# Patient Record
Sex: MALE | Race: WHITE | NOT HISPANIC OR LATINO | Employment: OTHER | URBAN - METROPOLITAN AREA
[De-identification: names, ages, dates, MRNs, and addresses within clinical notes are randomized per-mention and may not be internally consistent; named-entity substitution may affect disease eponyms.]

---

## 2024-09-20 ENCOUNTER — APPOINTMENT (OUTPATIENT)
Dept: RADIOLOGY | Facility: HOSPITAL | Age: 79
End: 2024-09-20
Payer: MEDICARE

## 2024-09-20 ENCOUNTER — HOSPITAL ENCOUNTER (EMERGENCY)
Facility: HOSPITAL | Age: 79
Discharge: HOME | End: 2024-09-20
Attending: EMERGENCY MEDICINE
Payer: MEDICARE

## 2024-09-20 VITALS
HEIGHT: 71 IN | WEIGHT: 178 LBS | SYSTOLIC BLOOD PRESSURE: 156 MMHG | HEART RATE: 70 BPM | OXYGEN SATURATION: 97 % | BODY MASS INDEX: 24.92 KG/M2 | RESPIRATION RATE: 16 BRPM | DIASTOLIC BLOOD PRESSURE: 78 MMHG | TEMPERATURE: 97 F

## 2024-09-20 DIAGNOSIS — M25.572 ACUTE LEFT ANKLE PAIN: Primary | ICD-10-CM

## 2024-09-20 PROCEDURE — 99284 EMERGENCY DEPT VISIT MOD MDM: CPT | Mod: 25

## 2024-09-20 PROCEDURE — 73610 X-RAY EXAM OF ANKLE: CPT | Mod: LT

## 2024-09-20 PROCEDURE — 93971 EXTREMITY STUDY: CPT

## 2024-09-20 RX ORDER — ACETAMINOPHEN 325 MG/1
975 TABLET ORAL ONCE
Status: COMPLETED | OUTPATIENT
Start: 2024-09-20 | End: 2024-09-20

## 2024-09-20 RX ORDER — LIDOCAINE, MENTHOL 4; 1 G/100G; G/100G
1 PATCH TOPICAL DAILY PRN
Qty: 30 PATCH | Refills: 0 | Status: SHIPPED | OUTPATIENT
Start: 2024-09-20 | End: 2024-10-20

## 2024-09-20 RX ORDER — ACETAMINOPHEN 500 MG/1
500 CAPSULE, LIQUID FILLED ORAL EVERY 6 HOURS PRN
Qty: 20 CAPSULE | Refills: 0 | Status: SHIPPED | OUTPATIENT
Start: 2024-09-20 | End: 2024-09-25

## 2024-09-20 RX ORDER — MELOXICAM 7.5 MG/1
7.5 TABLET ORAL DAILY
Qty: 15 TABLET | Refills: 0 | Status: SHIPPED | OUTPATIENT
Start: 2024-09-20

## 2024-09-20 ASSESSMENT — LIFESTYLE VARIABLES
TOTAL SCORE: 0
HAVE YOU EVER FELT YOU SHOULD CUT DOWN ON YOUR DRINKING: NO
HAVE PEOPLE ANNOYED YOU BY CRITICIZING YOUR DRINKING: NO
EVER FELT BAD OR GUILTY ABOUT YOUR DRINKING: NO
EVER HAD A DRINK FIRST THING IN THE MORNING TO STEADY YOUR NERVES TO GET RID OF A HANGOVER: NO

## 2024-09-20 ASSESSMENT — PAIN SCALES - GENERAL
PAINLEVEL_OUTOF10: 7
PAINLEVEL_OUTOF10: 7

## 2024-09-20 ASSESSMENT — PAIN DESCRIPTION - ONSET: ONSET: SUDDEN

## 2024-09-20 ASSESSMENT — PAIN DESCRIPTION - ORIENTATION: ORIENTATION: LEFT

## 2024-09-20 ASSESSMENT — PAIN DESCRIPTION - DESCRIPTORS
DESCRIPTORS: SHARP
DESCRIPTORS: SHARP

## 2024-09-20 ASSESSMENT — COLUMBIA-SUICIDE SEVERITY RATING SCALE - C-SSRS
1. IN THE PAST MONTH, HAVE YOU WISHED YOU WERE DEAD OR WISHED YOU COULD GO TO SLEEP AND NOT WAKE UP?: NO
2. HAVE YOU ACTUALLY HAD ANY THOUGHTS OF KILLING YOURSELF?: NO
6. HAVE YOU EVER DONE ANYTHING, STARTED TO DO ANYTHING, OR PREPARED TO DO ANYTHING TO END YOUR LIFE?: NO

## 2024-09-20 ASSESSMENT — PAIN DESCRIPTION - FREQUENCY: FREQUENCY: INTERMITTENT

## 2024-09-20 ASSESSMENT — PAIN DESCRIPTION - PAIN TYPE: TYPE: ACUTE PAIN

## 2024-09-20 ASSESSMENT — PAIN - FUNCTIONAL ASSESSMENT: PAIN_FUNCTIONAL_ASSESSMENT: 0-10

## 2024-09-20 ASSESSMENT — PAIN DESCRIPTION - PROGRESSION: CLINICAL_PROGRESSION: NOT CHANGED

## 2024-09-20 ASSESSMENT — PAIN DESCRIPTION - LOCATION: LOCATION: ANKLE

## 2024-09-20 NOTE — ED PROVIDER NOTES
HPI   Chief Complaint   Patient presents with    Leg Pain     Pain to left lower leg just above the ankle, pt denies any injury. Pt is not currently on any blood thinners.         History provided by:  Patient and relative  History of Present Illness:  78-year-old male presents with left lower ankle pain.  It woke him up at about 1 AM this morning while in bed.  It is intermittent.  Nothing seems to make it worse or better.  No treatment prior to arrival.  His foot feels the same temperature as his other foot.  He does not have any loss of sensation.  No fever or chills.  No abdominal or back pain.      PMFSH:   As per HPI, otherwise nurses notes reviewed in EMR.    Past Medical History:   Active Ambulatory Problems     Diagnosis Date Noted    No Active Ambulatory Problems     Resolved Ambulatory Problems     Diagnosis Date Noted    No Resolved Ambulatory Problems     Past Medical History:   Diagnosis Date    Atherosclerotic heart disease of native coronary artery without angina pectoris     Personal history of other diseases of the circulatory system     Personal history of other diseases of the circulatory system     Personal history of other endocrine, nutritional and metabolic disease     Personal history of other endocrine, nutritional and metabolic disease       Past Surgical History:   Past Surgical History:   Procedure Laterality Date    OTHER SURGICAL HISTORY  10/03/2021    Coronary artery bypass graft      Family History: No family history on file.   Social History:    Social History     Socioeconomic History    Marital status:      Spouse name: Not on file    Number of children: Not on file    Years of education: Not on file    Highest education level: Not on file   Occupational History    Not on file   Tobacco Use    Smoking status: Not on file    Smokeless tobacco: Not on file   Substance and Sexual Activity    Alcohol use: Not on file    Drug use: Not on file    Sexual activity: Not on file    Other Topics Concern    Not on file   Social History Narrative    Not on file     Social Determinants of Health     Financial Resource Strain: Not on file   Food Insecurity: Not on file   Transportation Needs: Not on file   Physical Activity: Not on file   Stress: Not on file   Social Connections: Not on file   Intimate Partner Violence: Not on file   Housing Stability: Not on file          Labs Reviewed - No data to display    Lower extremity venous duplex left   Final Result   Negative study.  No deep venous thrombosis of the  right lower   extremity.        MACRO:   None        Signed by: April Mcleod 9/20/2024 10:50 AM   Dictation workstation:   [id number]      XR ankle left 3+ views   Final Result   1. No acute left ankle fracture or major malalignment.   2. Small calcaneal bony spur. Left midfoot arthritic changes.        MACRO:   None        Signed by: Mendoza Glaser 9/20/2024 10:05 AM   Dictation workstation:   [id number]          Diagnostic testing considered:         Review of recent and relevant records:    ED Medication Administration:   ED Medication Administration from 09/20/2024 0839 to 09/20/2024 1058         Date/Time Order Dose Route Action Action by     09/20/2024 1002 EDT acetaminophen (Tylenol) tablet 975 mg 975 mg oral Given FOREST Alves            Prescription Medication Consideration/Given:     Social Determinants of Health Significantly Affecting Care:      Summary:    BP      Temp      Pulse     Resp      SpO2        Abnormal Labs Reviewed - No data to display    Diagnoses as of 09/20/24 1058   Acute left ankle pain    hh        Patient History   Past Medical History:   Diagnosis Date    Atherosclerotic heart disease of native coronary artery without angina pectoris     CAD, multiple vessel    Personal history of other diseases of the circulatory system     H/O: HTN (hypertension)    Personal history of other diseases of the circulatory system     History of atrial fibrillation    Personal  history of other endocrine, nutritional and metabolic disease     History of diabetes mellitus    Personal history of other endocrine, nutritional and metabolic disease     History of high cholesterol     Past Surgical History:   Procedure Laterality Date    OTHER SURGICAL HISTORY  10/03/2021    Coronary artery bypass graft     No family history on file.  Social History     Tobacco Use    Smoking status: Not on file    Smokeless tobacco: Not on file   Substance Use Topics    Alcohol use: Not on file    Drug use: Not on file       Physical Exam   ED Triage Vitals [09/20/24 0846]   Temperature Heart Rate Respirations BP   36.1 °C (97 °F) 72 18 (!) 169/98      Pulse Ox Temp Source Heart Rate Source Patient Position   99 % Tympanic Monitor Lying      BP Location FiO2 (%)     Right arm --       Physical Exam  Physical Exam:    ED Triage Vitals [09/20/24 0846]   Temperature Heart Rate Respirations BP   36.1 °C (97 °F) 72 18 (!) 169/98      Pulse Ox Temp Source Heart Rate Source Patient Position   99 % Tympanic Monitor Lying      BP Location FiO2 (%)     Right arm --         Constitutional: Vital signs per nursing notes.  Well developed, well nourished.  No acute distress.    Psychiatric: alert and oriented to person, place, and time; no abnormalities of mood or affect; memory intact  Eyes: PERRL; conjunctivae and lids normal; EOMI  Cardiovascular: symmetric pulses; no edema; normal capillary refill; distal pulses present  Neurological: normal speech; CN II-XII grossly intact; normal motor and sensory function; no nystagmus  GI: no masses, tenderness, rebound or guarding; no palpable, pulsatile mass; no organomegaly; no hernia; normal bowel sounds; (-) Delgado´s sign; (-) McBurney´s sign; (-) CVA tenderness  Lymphatic: no adenopathy of groin  Musculoskeletal: normal gait and station; normal digits and nails; no gross tendon or ligament injury; normal to palpation; normal strength/tone; neurovascular status intact; (-)  Josephine´s sign; (-) straight leg raise; except left medial ankle with mild tenderness to palpation  Skin: normal to inspection; normal to palpation; no rash  GCS: 15      ED Course & MDM   Diagnoses as of 09/20/24 1058   Acute left ankle pain                 No data recorded     [place] Coma Scale Score: 15 (09/20/24 0941 : Kalee Alves RN)                           Medical Decision Making  Medical Decision Making:    Differential Diagnoses Considered: DVT, peripheral vascular disease, arterial occlusion, muscle strain, ankle sprain, fracture, dislocation     EKG:    Labs Reviewed - No data to display    Lower extremity venous duplex left   Final Result   Negative study.  No deep venous thrombosis of the  right lower   extremity.        MACRO:   None        Signed by: April cMleod 9/20/2024 10:50 AM   Dictation workstation:   [id number]      XR ankle left 3+ views   Final Result   1. No acute left ankle fracture or major malalignment.   2. Small calcaneal bony spur. Left midfoot arthritic changes.        MACRO:   None        Signed by: Mendoza Glaser 9/20/2024 10:05 AM   Dictation workstation:   [id number]          Diagnostic testing considered:         Review of recent and relevant records:    ED Medication Administration:   ED Medication Administration from 09/20/2024 0839 to 09/20/2024 1058         Date/Time Order Dose Route Action Action by     09/20/2024 1002 EDT acetaminophen (Tylenol) tablet 975 mg 975 mg oral Given FOREST Alves            Prescription Medication Consideration/Given:     Social Determinants of Health Significantly Affecting Care:      Summary:    BP      Temp      Pulse     Resp      SpO2        Abnormal Labs Reviewed - No data to display    Diagnostic evaluation was completed.  Ultrasound of the left lower extremity is negative.  No DVT of the right lower extremity.  X-ray of the left ankle shows no acute left ankle fracture or major malalignment.  Small calcaneal bony spur.  Left  midfoot arthritic changes.    Patient was treated in the emergency department with medications for his discomfort.    No urgent or emergent conditions were identified.  The patient will be discharged home with short-term follow-up with his primary care provider.  He will be given medications for his pain and discomfort.    I discussed the results and discharge plan with the patient and/or family/friend if present.  I emphasized the importance of follow up with the physician I referred them to in the timeframe recommended.  I explained reasons for the patient to return to the Emergency Department.  Questions were addressed.  The patient and/or family/friend expressed understanding.        Diagnoses as of 09/20/24 1058   Acute left ankle pain       Procedure  Procedures     Neo RUANO MD  09/20/24 1057

## 2025-04-24 ENCOUNTER — APPOINTMENT (OUTPATIENT)
Dept: CARDIOLOGY | Facility: HOSPITAL | Age: 80
DRG: 641 | End: 2025-04-24
Payer: OTHER GOVERNMENT

## 2025-04-24 ENCOUNTER — HOSPITAL ENCOUNTER (INPATIENT)
Facility: HOSPITAL | Age: 80
LOS: 1 days | Discharge: HOME | DRG: 641 | End: 2025-04-27
Attending: EMERGENCY MEDICINE | Admitting: HOSPITALIST
Payer: OTHER GOVERNMENT

## 2025-04-24 ENCOUNTER — APPOINTMENT (OUTPATIENT)
Dept: RADIOLOGY | Facility: HOSPITAL | Age: 80
DRG: 641 | End: 2025-04-24
Payer: OTHER GOVERNMENT

## 2025-04-24 DIAGNOSIS — E87.5 HYPERKALEMIA: Primary | ICD-10-CM

## 2025-04-24 DIAGNOSIS — N40.0 BENIGN PROSTATIC HYPERPLASIA WITHOUT LOWER URINARY TRACT SYMPTOMS: ICD-10-CM

## 2025-04-24 PROBLEM — I25.10 CAD (CORONARY ARTERY DISEASE): Status: ACTIVE | Noted: 2025-04-24

## 2025-04-24 PROBLEM — Z95.1 HX OF CABG: Status: ACTIVE | Noted: 2025-04-24

## 2025-04-24 PROBLEM — E11.9 TYPE 2 DIABETES MELLITUS WITHOUT COMPLICATION, WITHOUT LONG-TERM CURRENT USE OF INSULIN: Status: ACTIVE | Noted: 2025-04-24

## 2025-04-24 PROBLEM — N18.31 STAGE 3A CHRONIC KIDNEY DISEASE (MULTI): Status: ACTIVE | Noted: 2025-04-24

## 2025-04-24 PROBLEM — I10 ESSENTIAL HYPERTENSION: Status: ACTIVE | Noted: 2025-04-24

## 2025-04-24 LAB
ALBUMIN SERPL BCP-MCNC: 4.3 G/DL (ref 3.4–5)
ALP SERPL-CCNC: 49 U/L (ref 33–136)
ALT SERPL W P-5'-P-CCNC: 20 U/L (ref 10–52)
ANION GAP SERPL CALC-SCNC: 12 MMOL/L (ref 10–20)
AST SERPL W P-5'-P-CCNC: 21 U/L (ref 9–39)
BASOPHILS # BLD AUTO: 0.09 X10*3/UL (ref 0–0.1)
BASOPHILS NFR BLD AUTO: 1.3 %
BILIRUB SERPL-MCNC: 0.5 MG/DL (ref 0–1.2)
BNP SERPL-MCNC: 284 PG/ML (ref 0–99)
BUN SERPL-MCNC: 28 MG/DL (ref 6–23)
CALCIUM SERPL-MCNC: 8.8 MG/DL (ref 8.6–10.3)
CARDIAC TROPONIN I PNL SERPL HS: 5 NG/L (ref 0–20)
CARDIAC TROPONIN I PNL SERPL HS: 5 NG/L (ref 0–20)
CHLORIDE SERPL-SCNC: 107 MMOL/L (ref 98–107)
CO2 SERPL-SCNC: 24 MMOL/L (ref 21–32)
CREAT SERPL-MCNC: 1.36 MG/DL (ref 0.5–1.3)
EGFRCR SERPLBLD CKD-EPI 2021: 53 ML/MIN/1.73M*2
EOSINOPHIL # BLD AUTO: 0.12 X10*3/UL (ref 0–0.4)
EOSINOPHIL NFR BLD AUTO: 1.7 %
ERYTHROCYTE [DISTWIDTH] IN BLOOD BY AUTOMATED COUNT: 14.9 % (ref 11.5–14.5)
GLUCOSE BLD MANUAL STRIP-MCNC: 149 MG/DL (ref 74–99)
GLUCOSE SERPL-MCNC: 98 MG/DL (ref 74–99)
HCT VFR BLD AUTO: 43.8 % (ref 41–52)
HGB BLD-MCNC: 13.6 G/DL (ref 13.5–17.5)
IMM GRANULOCYTES # BLD AUTO: 0.03 X10*3/UL (ref 0–0.5)
IMM GRANULOCYTES NFR BLD AUTO: 0.4 % (ref 0–0.9)
INR PPP: 1.2 (ref 0.9–1.1)
LACTATE SERPL-SCNC: 0.5 MMOL/L (ref 0.4–2)
LYMPHOCYTES # BLD AUTO: 1.37 X10*3/UL (ref 0.8–3)
LYMPHOCYTES NFR BLD AUTO: 19.8 %
MAGNESIUM SERPL-MCNC: 1.67 MG/DL (ref 1.6–2.4)
MCH RBC QN AUTO: 29.9 PG (ref 26–34)
MCHC RBC AUTO-ENTMCNC: 31.1 G/DL (ref 32–36)
MCV RBC AUTO: 96 FL (ref 80–100)
MONOCYTES # BLD AUTO: 0.68 X10*3/UL (ref 0.05–0.8)
MONOCYTES NFR BLD AUTO: 9.8 %
NEUTROPHILS # BLD AUTO: 4.63 X10*3/UL (ref 1.6–5.5)
NEUTROPHILS NFR BLD AUTO: 67 %
NRBC BLD-RTO: 0 /100 WBCS (ref 0–0)
PLATELET # BLD AUTO: 339 X10*3/UL (ref 150–450)
POTASSIUM SERPL-SCNC: 5.9 MMOL/L (ref 3.5–5.3)
POTASSIUM SERPL-SCNC: 6.4 MMOL/L (ref 3.5–5.3)
PROT SERPL-MCNC: 6.9 G/DL (ref 6.4–8.2)
PROTHROMBIN TIME: 12.9 SECONDS (ref 9.8–12.4)
RBC # BLD AUTO: 4.55 X10*6/UL (ref 4.5–5.9)
SODIUM SERPL-SCNC: 137 MMOL/L (ref 136–145)
WBC # BLD AUTO: 6.9 X10*3/UL (ref 4.4–11.3)

## 2025-04-24 PROCEDURE — 84484 ASSAY OF TROPONIN QUANT: CPT | Performed by: EMERGENCY MEDICINE

## 2025-04-24 PROCEDURE — 2500000001 HC RX 250 WO HCPCS SELF ADMINISTERED DRUGS (ALT 637 FOR MEDICARE OP): Performed by: STUDENT IN AN ORGANIZED HEALTH CARE EDUCATION/TRAINING PROGRAM

## 2025-04-24 PROCEDURE — 99223 1ST HOSP IP/OBS HIGH 75: CPT | Performed by: STUDENT IN AN ORGANIZED HEALTH CARE EDUCATION/TRAINING PROGRAM

## 2025-04-24 PROCEDURE — 84075 ASSAY ALKALINE PHOSPHATASE: CPT | Performed by: EMERGENCY MEDICINE

## 2025-04-24 PROCEDURE — 84132 ASSAY OF SERUM POTASSIUM: CPT | Performed by: EMERGENCY MEDICINE

## 2025-04-24 PROCEDURE — 83735 ASSAY OF MAGNESIUM: CPT | Performed by: EMERGENCY MEDICINE

## 2025-04-24 PROCEDURE — 2500000002 HC RX 250 W HCPCS SELF ADMINISTERED DRUGS (ALT 637 FOR MEDICARE OP, ALT 636 FOR OP/ED): Performed by: EMERGENCY MEDICINE

## 2025-04-24 PROCEDURE — 93005 ELECTROCARDIOGRAM TRACING: CPT

## 2025-04-24 PROCEDURE — 83605 ASSAY OF LACTIC ACID: CPT | Performed by: EMERGENCY MEDICINE

## 2025-04-24 PROCEDURE — 82947 ASSAY GLUCOSE BLOOD QUANT: CPT

## 2025-04-24 PROCEDURE — 83880 ASSAY OF NATRIURETIC PEPTIDE: CPT | Performed by: EMERGENCY MEDICINE

## 2025-04-24 PROCEDURE — 99291 CRITICAL CARE FIRST HOUR: CPT | Mod: 25 | Performed by: EMERGENCY MEDICINE

## 2025-04-24 PROCEDURE — 36415 COLL VENOUS BLD VENIPUNCTURE: CPT | Performed by: EMERGENCY MEDICINE

## 2025-04-24 PROCEDURE — 85025 COMPLETE CBC W/AUTO DIFF WBC: CPT | Performed by: EMERGENCY MEDICINE

## 2025-04-24 PROCEDURE — 96365 THER/PROPH/DIAG IV INF INIT: CPT

## 2025-04-24 PROCEDURE — 2500000004 HC RX 250 GENERAL PHARMACY W/ HCPCS (ALT 636 FOR OP/ED): Performed by: EMERGENCY MEDICINE

## 2025-04-24 PROCEDURE — 71045 X-RAY EXAM CHEST 1 VIEW: CPT | Performed by: RADIOLOGY

## 2025-04-24 PROCEDURE — 85610 PROTHROMBIN TIME: CPT | Performed by: EMERGENCY MEDICINE

## 2025-04-24 PROCEDURE — 96375 TX/PRO/DX INJ NEW DRUG ADDON: CPT

## 2025-04-24 PROCEDURE — 2500000005 HC RX 250 GENERAL PHARMACY W/O HCPCS: Performed by: EMERGENCY MEDICINE

## 2025-04-24 PROCEDURE — G0378 HOSPITAL OBSERVATION PER HR: HCPCS

## 2025-04-24 PROCEDURE — 96361 HYDRATE IV INFUSION ADD-ON: CPT

## 2025-04-24 PROCEDURE — 71045 X-RAY EXAM CHEST 1 VIEW: CPT

## 2025-04-24 RX ORDER — CARVEDILOL 25 MG/1
25 TABLET ORAL 2 TIMES DAILY
COMMUNITY
Start: 2024-10-07

## 2025-04-24 RX ORDER — CARVEDILOL 12.5 MG/1
25 TABLET ORAL ONCE
Status: COMPLETED | OUTPATIENT
Start: 2025-04-24 | End: 2025-04-24

## 2025-04-24 RX ORDER — DEXTROSE 50 % IN WATER (D50W) INTRAVENOUS SYRINGE
25 ONCE
Status: COMPLETED | OUTPATIENT
Start: 2025-04-24 | End: 2025-04-24

## 2025-04-24 RX ORDER — ASPIRIN 81 MG/1
81 TABLET ORAL DAILY
COMMUNITY

## 2025-04-24 RX ORDER — SODIUM BICARBONATE 1 MEQ/ML
50 SYRINGE (ML) INTRAVENOUS ONCE
Status: COMPLETED | OUTPATIENT
Start: 2025-04-24 | End: 2025-04-24

## 2025-04-24 RX ORDER — CALCIUM GLUCONATE 20 MG/ML
2 INJECTION, SOLUTION INTRAVENOUS ONCE
Status: COMPLETED | OUTPATIENT
Start: 2025-04-24 | End: 2025-04-24

## 2025-04-24 RX ORDER — LISINOPRIL 10 MG/1
10 TABLET ORAL
COMMUNITY
Start: 2024-12-26 | End: 2025-04-27 | Stop reason: HOSPADM

## 2025-04-24 RX ORDER — FINASTERIDE 5 MG/1
5 TABLET, FILM COATED ORAL
COMMUNITY
Start: 2024-09-17

## 2025-04-24 RX ORDER — FUROSEMIDE 10 MG/ML
40 INJECTION INTRAMUSCULAR; INTRAVENOUS ONCE
Status: COMPLETED | OUTPATIENT
Start: 2025-04-24 | End: 2025-04-24

## 2025-04-24 RX ADMIN — DEXTROSE MONOHYDRATE 25 G: 25 INJECTION, SOLUTION INTRAVENOUS at 22:50

## 2025-04-24 RX ADMIN — CARVEDILOL 25 MG: 12.5 TABLET, FILM COATED ORAL at 22:52

## 2025-04-24 RX ADMIN — SODIUM CHLORIDE 500 ML: 0.9 INJECTION, SOLUTION INTRAVENOUS at 22:50

## 2025-04-24 RX ADMIN — INSULIN HUMAN 10 UNITS: 100 INJECTION, SOLUTION PARENTERAL at 22:51

## 2025-04-24 RX ADMIN — CALCIUM GLUCONATE 2 G: 20 INJECTION, SOLUTION INTRAVENOUS at 22:50

## 2025-04-24 RX ADMIN — SODIUM BICARBONATE 50 MEQ: 84 INJECTION INTRAVENOUS at 22:51

## 2025-04-24 RX ADMIN — FUROSEMIDE 40 MG: 10 INJECTION, SOLUTION INTRAMUSCULAR; INTRAVENOUS at 22:52

## 2025-04-24 ASSESSMENT — COLUMBIA-SUICIDE SEVERITY RATING SCALE - C-SSRS
2. HAVE YOU ACTUALLY HAD ANY THOUGHTS OF KILLING YOURSELF?: NO
6. HAVE YOU EVER DONE ANYTHING, STARTED TO DO ANYTHING, OR PREPARED TO DO ANYTHING TO END YOUR LIFE?: NO
1. IN THE PAST MONTH, HAVE YOU WISHED YOU WERE DEAD OR WISHED YOU COULD GO TO SLEEP AND NOT WAKE UP?: NO

## 2025-04-24 ASSESSMENT — PAIN SCALES - GENERAL: PAINLEVEL_OUTOF10: 0 - NO PAIN

## 2025-04-24 ASSESSMENT — PAIN - FUNCTIONAL ASSESSMENT: PAIN_FUNCTIONAL_ASSESSMENT: 0-10

## 2025-04-25 ENCOUNTER — APPOINTMENT (OUTPATIENT)
Dept: RADIOLOGY | Facility: HOSPITAL | Age: 80
DRG: 641 | End: 2025-04-25
Payer: OTHER GOVERNMENT

## 2025-04-25 ENCOUNTER — APPOINTMENT (OUTPATIENT)
Dept: CARDIOLOGY | Facility: HOSPITAL | Age: 80
DRG: 641 | End: 2025-04-25
Payer: OTHER GOVERNMENT

## 2025-04-25 DIAGNOSIS — E87.5 HYPERKALEMIA: ICD-10-CM

## 2025-04-25 LAB
25(OH)D3 SERPL-MCNC: 8 NG/ML (ref 30–100)
ANION GAP SERPL CALC-SCNC: 11 MMOL/L (ref 10–20)
ANION GAP SERPL CALC-SCNC: 9 MMOL/L (ref 10–20)
APPEARANCE UR: CLEAR
ATRIAL RATE: 61 BPM
ATRIAL RATE: 71 BPM
ATRIAL RATE: 74 BPM
BACTERIA #/AREA URNS AUTO: ABNORMAL /HPF
BILIRUB UR STRIP.AUTO-MCNC: NEGATIVE MG/DL
BUN SERPL-MCNC: 27 MG/DL (ref 6–23)
BUN SERPL-MCNC: 29 MG/DL (ref 6–23)
CALCIUM SERPL-MCNC: 9.4 MG/DL (ref 8.6–10.3)
CALCIUM SERPL-MCNC: 9.7 MG/DL (ref 8.6–10.3)
CHLORIDE SERPL-SCNC: 104 MMOL/L (ref 98–107)
CHLORIDE SERPL-SCNC: 105 MMOL/L (ref 98–107)
CO2 SERPL-SCNC: 30 MMOL/L (ref 21–32)
CO2 SERPL-SCNC: 31 MMOL/L (ref 21–32)
COLOR UR: ABNORMAL
CREAT SERPL-MCNC: 1.44 MG/DL (ref 0.5–1.3)
CREAT SERPL-MCNC: 1.52 MG/DL (ref 0.5–1.3)
CREAT UR-MCNC: 41.6 MG/DL (ref 20–370)
EGFRCR SERPLBLD CKD-EPI 2021: 47 ML/MIN/1.73M*2
EGFRCR SERPLBLD CKD-EPI 2021: 50 ML/MIN/1.73M*2
ERYTHROCYTE [DISTWIDTH] IN BLOOD BY AUTOMATED COUNT: 14.9 % (ref 11.5–14.5)
GLUCOSE BLD MANUAL STRIP-MCNC: 101 MG/DL (ref 74–99)
GLUCOSE BLD MANUAL STRIP-MCNC: 131 MG/DL (ref 74–99)
GLUCOSE BLD MANUAL STRIP-MCNC: 77 MG/DL (ref 74–99)
GLUCOSE BLD MANUAL STRIP-MCNC: 95 MG/DL (ref 74–99)
GLUCOSE SERPL-MCNC: 111 MG/DL (ref 74–99)
GLUCOSE SERPL-MCNC: 77 MG/DL (ref 74–99)
GLUCOSE UR STRIP.AUTO-MCNC: ABNORMAL MG/DL
HCT VFR BLD AUTO: 45.7 % (ref 41–52)
HGB BLD-MCNC: 14.1 G/DL (ref 13.5–17.5)
HOLD SPECIMEN: NORMAL
KETONES UR STRIP.AUTO-MCNC: NEGATIVE MG/DL
LEUKOCYTE ESTERASE UR QL STRIP.AUTO: NEGATIVE
MAGNESIUM SERPL-MCNC: 1.56 MG/DL (ref 1.6–2.4)
MCH RBC QN AUTO: 29.5 PG (ref 26–34)
MCHC RBC AUTO-ENTMCNC: 30.9 G/DL (ref 32–36)
MCV RBC AUTO: 96 FL (ref 80–100)
MICROALBUMIN UR-MCNC: 164.3 MG/L
MICROALBUMIN/CREAT UR: 395 UG/MG CREAT
NITRITE UR QL STRIP.AUTO: NEGATIVE
NRBC BLD-RTO: 0 /100 WBCS (ref 0–0)
P AXIS: -26 DEGREES
P AXIS: 8 DEGREES
P OFFSET: 195 MS
P OFFSET: 196 MS
P OFFSET: 196 MS
P ONSET: 130 MS
P ONSET: 141 MS
P ONSET: 148 MS
PH UR STRIP.AUTO: 5.5 [PH]
PLATELET # BLD AUTO: 376 X10*3/UL (ref 150–450)
POTASSIUM SERPL-SCNC: 4.6 MMOL/L (ref 3.5–5.3)
POTASSIUM SERPL-SCNC: 5.1 MMOL/L (ref 3.5–5.3)
PR INTERVAL: 164 MS
PR INTERVAL: 166 MS
PR INTERVAL: 174 MS
PROT UR STRIP.AUTO-MCNC: ABNORMAL MG/DL
PTH-INTACT SERPL-MCNC: 84.5 PG/ML (ref 18.5–88)
Q ONSET: 213 MS
Q ONSET: 228 MS
Q ONSET: 230 MS
QRS COUNT: 10 BEATS
QRS COUNT: 12 BEATS
QRS COUNT: 12 BEATS
QRS DURATION: 64 MS
QRS DURATION: 74 MS
QRS DURATION: 84 MS
QT INTERVAL: 378 MS
QT INTERVAL: 384 MS
QT INTERVAL: 412 MS
QTC CALCULATION(BAZETT): 410 MS
QTC CALCULATION(BAZETT): 414 MS
QTC CALCULATION(BAZETT): 426 MS
QTC FREDERICIA: 400 MS
QTC FREDERICIA: 412 MS
QTC FREDERICIA: 414 MS
R AXIS: -14 DEGREES
R AXIS: -4 DEGREES
R AXIS: -6 DEGREES
RBC # BLD AUTO: 4.78 X10*6/UL (ref 4.5–5.9)
RBC # UR STRIP.AUTO: NEGATIVE MG/DL
RBC #/AREA URNS AUTO: ABNORMAL /HPF
SODIUM SERPL-SCNC: 139 MMOL/L (ref 136–145)
SODIUM SERPL-SCNC: 141 MMOL/L (ref 136–145)
SP GR UR STRIP.AUTO: 1.01
T AXIS: 11 DEGREES
T AXIS: 27 DEGREES
T AXIS: 8 DEGREES
T OFFSET: 405 MS
T OFFSET: 417 MS
T OFFSET: 436 MS
TSH SERPL-ACNC: 2 MIU/L (ref 0.44–3.98)
UROBILINOGEN UR STRIP.AUTO-MCNC: NORMAL MG/DL
VENTRICULAR RATE: 61 BPM
VENTRICULAR RATE: 71 BPM
VENTRICULAR RATE: 74 BPM
WBC # BLD AUTO: 7 X10*3/UL (ref 4.4–11.3)
WBC #/AREA URNS AUTO: ABNORMAL /HPF

## 2025-04-25 PROCEDURE — 83970 ASSAY OF PARATHORMONE: CPT | Mod: ELYLAB | Performed by: INTERNAL MEDICINE

## 2025-04-25 PROCEDURE — 99239 HOSP IP/OBS DSCHRG MGMT >30: CPT | Performed by: HOSPITALIST

## 2025-04-25 PROCEDURE — 83735 ASSAY OF MAGNESIUM: CPT | Performed by: STUDENT IN AN ORGANIZED HEALTH CARE EDUCATION/TRAINING PROGRAM

## 2025-04-25 PROCEDURE — 36415 COLL VENOUS BLD VENIPUNCTURE: CPT | Performed by: HOSPITALIST

## 2025-04-25 PROCEDURE — 93010 ELECTROCARDIOGRAM REPORT: CPT | Performed by: INTERNAL MEDICINE

## 2025-04-25 PROCEDURE — G0378 HOSPITAL OBSERVATION PER HR: HCPCS

## 2025-04-25 PROCEDURE — 81001 URINALYSIS AUTO W/SCOPE: CPT | Performed by: INTERNAL MEDICINE

## 2025-04-25 PROCEDURE — 82306 VITAMIN D 25 HYDROXY: CPT | Mod: ELYLAB | Performed by: INTERNAL MEDICINE

## 2025-04-25 PROCEDURE — 2500000004 HC RX 250 GENERAL PHARMACY W/ HCPCS (ALT 636 FOR OP/ED): Performed by: HOSPITALIST

## 2025-04-25 PROCEDURE — 82570 ASSAY OF URINE CREATININE: CPT | Performed by: INTERNAL MEDICINE

## 2025-04-25 PROCEDURE — 76770 US EXAM ABDO BACK WALL COMP: CPT | Performed by: RADIOLOGY

## 2025-04-25 PROCEDURE — 80048 BASIC METABOLIC PNL TOTAL CA: CPT | Performed by: HOSPITALIST

## 2025-04-25 PROCEDURE — 84443 ASSAY THYROID STIM HORMONE: CPT | Performed by: INTERNAL MEDICINE

## 2025-04-25 PROCEDURE — 82947 ASSAY GLUCOSE BLOOD QUANT: CPT

## 2025-04-25 PROCEDURE — 80048 BASIC METABOLIC PNL TOTAL CA: CPT | Performed by: STUDENT IN AN ORGANIZED HEALTH CARE EDUCATION/TRAINING PROGRAM

## 2025-04-25 PROCEDURE — 76770 US EXAM ABDO BACK WALL COMP: CPT

## 2025-04-25 PROCEDURE — 2500000001 HC RX 250 WO HCPCS SELF ADMINISTERED DRUGS (ALT 637 FOR MEDICARE OP): Performed by: STUDENT IN AN ORGANIZED HEALTH CARE EDUCATION/TRAINING PROGRAM

## 2025-04-25 PROCEDURE — 85027 COMPLETE CBC AUTOMATED: CPT | Performed by: STUDENT IN AN ORGANIZED HEALTH CARE EDUCATION/TRAINING PROGRAM

## 2025-04-25 PROCEDURE — 93005 ELECTROCARDIOGRAM TRACING: CPT

## 2025-04-25 PROCEDURE — 84153 ASSAY OF PSA TOTAL: CPT | Mod: ELYLAB | Performed by: UROLOGY

## 2025-04-25 RX ORDER — POLYETHYLENE GLYCOL 3350 17 G/17G
17 POWDER, FOR SOLUTION ORAL DAILY
Status: DISCONTINUED | OUTPATIENT
Start: 2025-04-25 | End: 2025-04-27 | Stop reason: HOSPADM

## 2025-04-25 RX ORDER — CARVEDILOL 12.5 MG/1
25 TABLET ORAL 2 TIMES DAILY
Status: DISCONTINUED | OUTPATIENT
Start: 2025-04-25 | End: 2025-04-27 | Stop reason: HOSPADM

## 2025-04-25 RX ORDER — ONDANSETRON HYDROCHLORIDE 2 MG/ML
4 INJECTION, SOLUTION INTRAVENOUS EVERY 8 HOURS PRN
Status: DISCONTINUED | OUTPATIENT
Start: 2025-04-25 | End: 2025-04-27 | Stop reason: HOSPADM

## 2025-04-25 RX ORDER — ONDANSETRON 4 MG/1
4 TABLET, FILM COATED ORAL EVERY 8 HOURS PRN
Status: DISCONTINUED | OUTPATIENT
Start: 2025-04-25 | End: 2025-04-27 | Stop reason: HOSPADM

## 2025-04-25 RX ORDER — FINASTERIDE 5 MG/1
5 TABLET, FILM COATED ORAL DAILY
Status: DISCONTINUED | OUTPATIENT
Start: 2025-04-25 | End: 2025-04-27 | Stop reason: HOSPADM

## 2025-04-25 RX ORDER — TALC
3 POWDER (GRAM) TOPICAL NIGHTLY PRN
Status: DISCONTINUED | OUTPATIENT
Start: 2025-04-25 | End: 2025-04-27 | Stop reason: HOSPADM

## 2025-04-25 RX ORDER — DEXTROSE 50 % IN WATER (D50W) INTRAVENOUS SYRINGE
12.5
Status: DISCONTINUED | OUTPATIENT
Start: 2025-04-25 | End: 2025-04-27 | Stop reason: HOSPADM

## 2025-04-25 RX ORDER — ASPIRIN 81 MG/1
81 TABLET ORAL DAILY
Status: DISCONTINUED | OUTPATIENT
Start: 2025-04-25 | End: 2025-04-27 | Stop reason: HOSPADM

## 2025-04-25 RX ORDER — ACETAMINOPHEN 325 MG/1
650 TABLET ORAL EVERY 4 HOURS PRN
Status: DISCONTINUED | OUTPATIENT
Start: 2025-04-25 | End: 2025-04-27 | Stop reason: HOSPADM

## 2025-04-25 RX ORDER — MAGNESIUM SULFATE HEPTAHYDRATE 40 MG/ML
2 INJECTION, SOLUTION INTRAVENOUS ONCE
Status: COMPLETED | OUTPATIENT
Start: 2025-04-25 | End: 2025-04-25

## 2025-04-25 RX ORDER — DEXTROSE 50 % IN WATER (D50W) INTRAVENOUS SYRINGE
25
Status: DISCONTINUED | OUTPATIENT
Start: 2025-04-25 | End: 2025-04-27 | Stop reason: HOSPADM

## 2025-04-25 RX ORDER — ACETAMINOPHEN 650 MG/1
650 SUPPOSITORY RECTAL EVERY 4 HOURS PRN
Status: DISCONTINUED | OUTPATIENT
Start: 2025-04-25 | End: 2025-04-27 | Stop reason: HOSPADM

## 2025-04-25 RX ORDER — ACETAMINOPHEN 160 MG/5ML
650 SOLUTION ORAL EVERY 4 HOURS PRN
Status: DISCONTINUED | OUTPATIENT
Start: 2025-04-25 | End: 2025-04-27 | Stop reason: HOSPADM

## 2025-04-25 RX ADMIN — EMPAGLIFLOZIN 25 MG: 25 TABLET, FILM COATED ORAL at 09:18

## 2025-04-25 RX ADMIN — CARVEDILOL 25 MG: 12.5 TABLET, FILM COATED ORAL at 20:37

## 2025-04-25 RX ADMIN — CARVEDILOL 25 MG: 12.5 TABLET, FILM COATED ORAL at 09:18

## 2025-04-25 RX ADMIN — ASPIRIN 81 MG: 81 TABLET, COATED ORAL at 09:18

## 2025-04-25 RX ADMIN — FINASTERIDE 5 MG: 5 TABLET, FILM COATED ORAL at 09:18

## 2025-04-25 RX ADMIN — MAGNESIUM SULFATE HEPTAHYDRATE 2 G: 40 INJECTION, SOLUTION INTRAVENOUS at 09:18

## 2025-04-25 SDOH — SOCIAL STABILITY: SOCIAL INSECURITY: HAS ANYONE EVER THREATENED TO HURT YOUR FAMILY OR YOUR PETS?: NO

## 2025-04-25 SDOH — HEALTH STABILITY: PHYSICAL HEALTH: ON AVERAGE, HOW MANY MINUTES DO YOU ENGAGE IN EXERCISE AT THIS LEVEL?: 110 MIN

## 2025-04-25 SDOH — SOCIAL STABILITY: SOCIAL INSECURITY
WITHIN THE LAST YEAR, HAVE YOU BEEN RAPED OR FORCED TO HAVE ANY KIND OF SEXUAL ACTIVITY BY YOUR PARTNER OR EX-PARTNER?: NO

## 2025-04-25 SDOH — ECONOMIC STABILITY: HOUSING INSECURITY: IN THE LAST 12 MONTHS, WAS THERE A TIME WHEN YOU WERE NOT ABLE TO PAY THE MORTGAGE OR RENT ON TIME?: NO

## 2025-04-25 SDOH — SOCIAL STABILITY: SOCIAL INSECURITY: WITHIN THE LAST YEAR, HAVE YOU BEEN AFRAID OF YOUR PARTNER OR EX-PARTNER?: NO

## 2025-04-25 SDOH — SOCIAL STABILITY: SOCIAL INSECURITY: HAVE YOU HAD ANY THOUGHTS OF HARMING ANYONE ELSE?: NO

## 2025-04-25 SDOH — ECONOMIC STABILITY: HOUSING INSECURITY: IN THE PAST 12 MONTHS, HOW MANY TIMES HAVE YOU MOVED WHERE YOU WERE LIVING?: 0

## 2025-04-25 SDOH — SOCIAL STABILITY: SOCIAL INSECURITY: WITHIN THE LAST YEAR, HAVE YOU BEEN HUMILIATED OR EMOTIONALLY ABUSED IN OTHER WAYS BY YOUR PARTNER OR EX-PARTNER?: NO

## 2025-04-25 SDOH — ECONOMIC STABILITY: FOOD INSECURITY: WITHIN THE PAST 12 MONTHS, THE FOOD YOU BOUGHT JUST DIDN'T LAST AND YOU DIDN'T HAVE MONEY TO GET MORE.: NEVER TRUE

## 2025-04-25 SDOH — ECONOMIC STABILITY: HOUSING INSECURITY: AT ANY TIME IN THE PAST 12 MONTHS, WERE YOU HOMELESS OR LIVING IN A SHELTER (INCLUDING NOW)?: NO

## 2025-04-25 SDOH — SOCIAL STABILITY: SOCIAL INSECURITY: ARE YOU MARRIED, WIDOWED, DIVORCED, SEPARATED, NEVER MARRIED, OR LIVING WITH A PARTNER?: DIVORCED

## 2025-04-25 SDOH — SOCIAL STABILITY: SOCIAL INSECURITY: WERE YOU ABLE TO COMPLETE ALL THE BEHAVIORAL HEALTH SCREENINGS?: YES

## 2025-04-25 SDOH — SOCIAL STABILITY: SOCIAL NETWORK: HOW OFTEN DO YOU ATTEND CHURCH OR RELIGIOUS SERVICES?: NEVER

## 2025-04-25 SDOH — ECONOMIC STABILITY: INCOME INSECURITY: IN THE PAST 12 MONTHS HAS THE ELECTRIC, GAS, OIL, OR WATER COMPANY THREATENED TO SHUT OFF SERVICES IN YOUR HOME?: NO

## 2025-04-25 SDOH — SOCIAL STABILITY: SOCIAL INSECURITY: DO YOU FEEL ANYONE HAS EXPLOITED OR TAKEN ADVANTAGE OF YOU FINANCIALLY OR OF YOUR PERSONAL PROPERTY?: NO

## 2025-04-25 SDOH — SOCIAL STABILITY: SOCIAL INSECURITY: DOES ANYONE TRY TO KEEP YOU FROM HAVING/CONTACTING OTHER FRIENDS OR DOING THINGS OUTSIDE YOUR HOME?: NO

## 2025-04-25 SDOH — HEALTH STABILITY: MENTAL HEALTH
DO YOU FEEL STRESS - TENSE, RESTLESS, NERVOUS, OR ANXIOUS, OR UNABLE TO SLEEP AT NIGHT BECAUSE YOUR MIND IS TROUBLED ALL THE TIME - THESE DAYS?: NOT AT ALL

## 2025-04-25 SDOH — SOCIAL STABILITY: SOCIAL INSECURITY: HAVE YOU HAD THOUGHTS OF HARMING ANYONE ELSE?: NO

## 2025-04-25 SDOH — ECONOMIC STABILITY: FOOD INSECURITY: HOW HARD IS IT FOR YOU TO PAY FOR THE VERY BASICS LIKE FOOD, HOUSING, MEDICAL CARE, AND HEATING?: NOT HARD AT ALL

## 2025-04-25 SDOH — HEALTH STABILITY: PHYSICAL HEALTH
HOW OFTEN DO YOU NEED TO HAVE SOMEONE HELP YOU WHEN YOU READ INSTRUCTIONS, PAMPHLETS, OR OTHER WRITTEN MATERIAL FROM YOUR DOCTOR OR PHARMACY?: RARELY

## 2025-04-25 SDOH — SOCIAL STABILITY: SOCIAL NETWORK: IN A TYPICAL WEEK, HOW MANY TIMES DO YOU TALK ON THE PHONE WITH FAMILY, FRIENDS, OR NEIGHBORS?: ONCE A WEEK

## 2025-04-25 SDOH — SOCIAL STABILITY: SOCIAL NETWORK: HOW OFTEN DO YOU ATTEND MEETINGS OF THE CLUBS OR ORGANIZATIONS YOU BELONG TO?: NEVER

## 2025-04-25 SDOH — ECONOMIC STABILITY: FOOD INSECURITY: WITHIN THE PAST 12 MONTHS, YOU WORRIED THAT YOUR FOOD WOULD RUN OUT BEFORE YOU GOT THE MONEY TO BUY MORE.: NEVER TRUE

## 2025-04-25 SDOH — SOCIAL STABILITY: SOCIAL INSECURITY: DO YOU FEEL UNSAFE GOING BACK TO THE PLACE WHERE YOU ARE LIVING?: NO

## 2025-04-25 SDOH — SOCIAL STABILITY: SOCIAL NETWORK
DO YOU BELONG TO ANY CLUBS OR ORGANIZATIONS SUCH AS CHURCH GROUPS, UNIONS, FRATERNAL OR ATHLETIC GROUPS, OR SCHOOL GROUPS?: NO

## 2025-04-25 SDOH — SOCIAL STABILITY: SOCIAL INSECURITY: ARE YOU OR HAVE YOU BEEN THREATENED OR ABUSED PHYSICALLY, EMOTIONALLY, OR SEXUALLY BY ANYONE?: NO

## 2025-04-25 SDOH — HEALTH STABILITY: PHYSICAL HEALTH: ON AVERAGE, HOW MANY DAYS PER WEEK DO YOU ENGAGE IN MODERATE TO STRENUOUS EXERCISE (LIKE A BRISK WALK)?: 7 DAYS

## 2025-04-25 SDOH — SOCIAL STABILITY: SOCIAL INSECURITY
WITHIN THE LAST YEAR, HAVE YOU BEEN KICKED, HIT, SLAPPED, OR OTHERWISE PHYSICALLY HURT BY YOUR PARTNER OR EX-PARTNER?: NO

## 2025-04-25 SDOH — SOCIAL STABILITY: SOCIAL NETWORK: HOW OFTEN DO YOU GET TOGETHER WITH FRIENDS OR RELATIVES?: ONCE A WEEK

## 2025-04-25 SDOH — ECONOMIC STABILITY: TRANSPORTATION INSECURITY: IN THE PAST 12 MONTHS, HAS LACK OF TRANSPORTATION KEPT YOU FROM MEDICAL APPOINTMENTS OR FROM GETTING MEDICATIONS?: NO

## 2025-04-25 SDOH — SOCIAL STABILITY: SOCIAL INSECURITY: ARE THERE ANY APPARENT SIGNS OF INJURIES/BEHAVIORS THAT COULD BE RELATED TO ABUSE/NEGLECT?: NO

## 2025-04-25 SDOH — SOCIAL STABILITY: SOCIAL INSECURITY: ABUSE: ADULT

## 2025-04-25 ASSESSMENT — COGNITIVE AND FUNCTIONAL STATUS - GENERAL
DAILY ACTIVITIY SCORE: 24
MOBILITY SCORE: 24
DAILY ACTIVITIY SCORE: 24
PATIENT BASELINE BEDBOUND: NO
DAILY ACTIVITIY SCORE: 24

## 2025-04-25 ASSESSMENT — LIFESTYLE VARIABLES
HOW OFTEN DO YOU HAVE 6 OR MORE DRINKS ON ONE OCCASION: NEVER
HOW MANY STANDARD DRINKS CONTAINING ALCOHOL DO YOU HAVE ON A TYPICAL DAY: PATIENT DOES NOT DRINK
SKIP TO QUESTIONS 9-10: 1
AUDIT-C TOTAL SCORE: 0
AUDIT-C TOTAL SCORE: 0
HOW OFTEN DO YOU HAVE A DRINK CONTAINING ALCOHOL: NEVER

## 2025-04-25 ASSESSMENT — ACTIVITIES OF DAILY LIVING (ADL)
JUDGMENT_ADEQUATE_SAFELY_COMPLETE_DAILY_ACTIVITIES: YES
ADEQUATE_TO_COMPLETE_ADL: YES
WALKS IN HOME: INDEPENDENT
GROOMING: INDEPENDENT
PATIENT'S MEMORY ADEQUATE TO SAFELY COMPLETE DAILY ACTIVITIES?: YES
FEEDING YOURSELF: INDEPENDENT
LACK_OF_TRANSPORTATION: NO
LACK_OF_TRANSPORTATION: NO
HEARING - LEFT EAR: FUNCTIONAL
LACK_OF_TRANSPORTATION: NO
BATHING: INDEPENDENT
TOILETING: INDEPENDENT
DRESSING YOURSELF: INDEPENDENT
HEARING - RIGHT EAR: FUNCTIONAL

## 2025-04-25 ASSESSMENT — PAIN SCALES - GENERAL
PAINLEVEL_OUTOF10: 0 - NO PAIN
PAINLEVEL_OUTOF10: 0 - NO PAIN

## 2025-04-25 ASSESSMENT — PATIENT HEALTH QUESTIONNAIRE - PHQ9
2. FEELING DOWN, DEPRESSED OR HOPELESS: NOT AT ALL
1. LITTLE INTEREST OR PLEASURE IN DOING THINGS: NOT AT ALL
SUM OF ALL RESPONSES TO PHQ9 QUESTIONS 1 & 2: 0

## 2025-04-25 ASSESSMENT — PAIN - FUNCTIONAL ASSESSMENT: PAIN_FUNCTIONAL_ASSESSMENT: 0-10

## 2025-04-25 NOTE — H&P
Medical Group History and Physical      ASSESSMENT & PLAN:     79 y.o. male with a history of essential hypertension, CKD stage IIIa, CAD s/p CABG, type 2 diabetes, BPH presenting with hyperkalemia.    #.  Hyperkalemia  #.  CKD stage IIIa  - [facility] however mild hemolysis noted now with repeat showing 5.9 without hemolysis  - EKG reviewed and shows NSR without peaked T waves or QRS widening  - S/p IV Calcium gluconate and temporizing measures in the ER  - Creatinine appears to be at baseline from prior labs    Plan:  - Holding home lisinopril  - Telemetry  - Nephrology consult  - Repeat labs in the morning  - Consider Lokelma if remains hyperkalemic despite holding Lisinopril    #.  Essential hypertension  #.  CAD s/p CABG  - Markedly hypertensive without signs of endorgan damage  - Continue home aspirin, Coreg  - Holding home lisinopril given hyperkalemia as above  - May need alternative agent instead of lisinopril if persistent issues with hyperkalemia    #.  Type 2 diabetes  - Continuing home Jardiance    #.  BPH  - Continue home finasteride    VTE PPX: SCDs      Khoi De Luna MD    --Of note, this documentation is completed using the Dragon Dictation system (voice recognition software). There may be spelling and/or grammatical errors that were not corrected prior to final submission.--    HISTORY OF PRESENT ILLNESS:   Chief Complaint: abnormal labs    Duane Meade is a 79 y.o. male with a history of essential hypertension, CKD stage IIIa, CAD s/p CABG, type 2 diabetes, BPH presenting with abnormal labs.  [facility] drawn.  He was sent in due to hyperkalemia.  Patient is asymptomatic at this time.  He is on lisinopril.       ER Course: Markedly hypertensive, otherwise vital signs stable.  Labs notable for hyperkalemia and elevated creatinine BUN (at baseline).  EKG shows normal sinus rhythm without peaked T waves or QRS  widening.  CXR negative for acute cardiopulmonary process.  Patient was given IV calcium and temporizing measures in the ER.    ROS  10 point review of systems negative except per HPI     PAST HISTORIES:     Past Medical History  He has a past medical history of Atherosclerotic heart disease of native coronary artery without angina pectoris, Personal history of other diseases of the circulatory system, Personal history of other diseases of the circulatory system, Personal history of other endocrine, nutritional and metabolic disease, and Personal history of other endocrine, nutritional and metabolic disease.    Surgical History  He has a past surgical history that includes Other surgical history (10/03/2021).     Social History  He has no history on file for tobacco use, alcohol use, and drug use.    Family History  Family History[1]    Allergies:  Patient has no known allergies.      OBJECTIVE:      Last Recorded Vitals  BP (!) 202/98 (BP Location: Right arm, Patient Position: Sitting)   Pulse 72   Temp 36.2 °C (97.2 °F) (Temporal)   Resp (!) 33   Wt 77.1 kg (170 lb)   SpO2 100%     Last I/O:  No intake/output data recorded.    Physical Exam   Gen: NAD, appears stated age  HEENT: EOM, MMM  CV: RRR, no murmurs rubs or gallops  Resp: Clear to auscultation bilaterally, normal effort  Abdomen: soft, NT,+BS  LE: No edema, no deformity  Neuro: A&Ox4, moving all extremities    LABS AND IMAGING:       Relevant Results  Labs Reviewed   CBC WITH AUTO DIFFERENTIAL - Abnormal       Result Value    WBC 6.9      nRBC 0.0      RBC 4.55      Hemoglobin 13.6      Hematocrit 43.8      MCV 96      MCH 29.9      MCHC 31.1 (*)     RDW 14.9 (*)     Platelets 339      Neutrophils % 67.0      Immature Granulocytes %, Automated 0.4      Lymphocytes % 19.8      Monocytes % 9.8      Eosinophils % 1.7      Basophils % 1.3      Neutrophils Absolute 4.63      Immature Granulocytes Absolute, Automated 0.03      Lymphocytes Absolute 1.37       Monocytes Absolute 0.68      Eosinophils Absolute 0.12      Basophils Absolute 0.09     COMPREHENSIVE METABOLIC PANEL - Abnormal    Glucose 98      Sodium 137      Potassium 6.4 (*)     Chloride 107      Bicarbonate 24      Anion Gap 12      Urea Nitrogen 28 (*)     Creatinine 1.36 (*)     eGFR 53 (*)     Calcium 8.8      Albumin 4.3      Alkaline Phosphatase 49      Total Protein 6.9      AST 21      Bilirubin, Total 0.5      ALT 20     PROTIME-INR - Abnormal    Protime 12.9 (*)     INR 1.2 (*)    B-TYPE NATRIURETIC PEPTIDE - Abnormal     (*)     Narrative:        <100 pg/mL - Heart failure unlikely  100-299 pg/mL - Intermediate probability of acute heart                  failure exacerbation. Correlate with clinical                  context and patient history.    >=300 pg/mL - Heart Failure likely. Correlate with clinical                  context and patient history.    BNP testing is performed using different testing methodology at [facility] than at other [facility]. Direct result comparisons should only be made within the same method.      MAGNESIUM - Normal    Magnesium 1.67     LACTATE - Normal    Lactate 0.5      Narrative:     Venipuncture immediately after or during the administration of Metamizole may lead to falsely low results. Testing should be performed immediately prior to Metamizole dosing.   SERIAL TROPONIN-INITIAL - Normal    Troponin I, High Sensitivity 5      Narrative:     Less than 99th percentile of normal range cutoff-  Female and children under 18 years old <14 ng/L; Male <21 ng/L: Negative  Repeat testing should be performed if clinically indicated.     Female and children under 18 years old 14-50 ng/L; Male 21-50 ng/L:  Consistent with possible cardiac damage and possible increased clinical   risk. Serial measurements may help to assess extent of myocardial damage.     >50 ng/L: Consistent with cardiac damage, increased clinical risk and  myocardial  infarction. Serial measurements may help assess extent of   myocardial damage.      NOTE: Children less than 1 year old may have higher baseline troponin   levels and results should be interpreted in conjunction with the overall   clinical context.     NOTE: Troponin I testing is performed using a different   testing methodology at [facility] than at other   [facility]. Direct result comparisons should only   be made within the same method.   TROPONIN SERIES- (INITIAL, 1 HR)    Narrative:     The following orders were created for panel order Troponin I Series, High Sensitivity (0, 1 HR).  Procedure                               Abnormality         Status                     ---------                               -----------         ------                     Troponin I, High Sensiti...[[id number]]  Normal              Final result               Troponin, High Sensitivi...[[id number]]                      In process                   Please view results for these tests on the individual orders.   SERIAL TROPONIN, 1 HOUR   POTASSIUM   POCT GLUCOSE METER     XR chest 1 view   Final Result   1. No acute cardiopulmonary process.        MACRO:   None.        Signed by: Annel Badillo 4/24/2025 9:22 PM   Dictation workstation:   [id number]                 [1] No family history on file.

## 2025-04-25 NOTE — CONSULTS
Nutrition Diet Education:     Reason for Assessment: Admission nursing screening    Patient is a 79 y.o. male presenting with hyperkalemia. Pt had MST of 4 but it was noted that weight loss was intentional and due to lifestyle changes and ozempic. Therefore, full assessment was not warranted but education was provided.       Nutrition History:  Food and Nutrient History: Pt reports eating many foods high in potassium recently. He had changed his diet to help facilitate weight loss, and therefore was eating more fruits and vegetables. States he does not eat as much meat anymore and does not always get a protein at each meal. Reports losing 70# intentionally over the last 1.5-2 years. Is on Ozempic. Provided education on plant based protein and potassium content of food. Notified attending of pt's recent high intake of potassium foods, as pt has had elevated serum potassium levels upon admission.     Education Documentation  Nutrition Care Manual, taught by Jahaira Donaldson RD at 4/25/2025  1:42 PM.  Learner: Patient  Readiness: Acceptance  Method: Handout, Explanation  Response: Verbalizes Understanding  Comment: Provided handouts on potassium content of food and high protein foods. Discussed following a 2 g potassium limit unless otherwise instructed by physician. Discussed getting 50-60 g protein (0.6-0.8 g/kg) spread out throughout the day.      Time Spent (min): 30 minutes

## 2025-04-25 NOTE — DISCHARGE INSTRUCTIONS
It was nice caring for you during your stay at [facility]. As we discussed,  -Your lisinopril and meloxicam have been held, please continue to take your blood pressure daily.    - Please follow-up within 1 week in regards to your primary care doctor to repeat lab work to determine if you Lisinopril can be restarted.  -You have been started Flomax for your prostate.    Wishing you a healthy recovery!

## 2025-04-25 NOTE — DISCHARGE SUMMARY
DISCHARGE DIAGNOSIS     #.  Hyperkalemia  #.  Acute on CKD stage IIIa  #.  BPH with lower urinary symptoms  #.  Essential hypertension  #.  CAD s/p CABG  #.  Type 2 diabetes      HOSPITAL COURSE AND DETAILS     79 y.o. male with a history of essential hypertension, CKD stage IIIa, CAD s/p CABG, type 2 diabetes, BPH presenting with hyperkalemia.     #.  Hyperkalemia  #.  Acute kidney injury on CKD stage IIIa  - [facility] however mild hemolysis noted now with repeat showing 5.9 without hemolysis  - EKG reviewed and shows NSR without peaked T waves or QRS widening  - S/p IV Calcium gluconate and temporizing measures in the ER  - s/p 1L fluid bolus on 4/26 due to worsening Scr.  -Serum creatinine peaked to 1.63, he was given a 1 L fluid bolus and overall improved; on discharge Scr 1.32 around his baseline  - His potassium and serum creatinine improved with holding the lisinopril and meloxicam temporizing measures.  -On discharge, he was advised to hold his lisinopril and meloxicam.  - Advised patient to check his blood pressure daily and keep a log of it, he has a repeat follow-up BMP in 1 week.    #.  BPH  - He was noted to have urinary retention during his hospitalization >700cc, did not require straight cath.  He was able to void small amounts, when prompted.  - Seen by urology, transrectal ultrasound showed grade 3+ prostate recommended outpatient follow-up with urology.  - Discharged on his home finasteride and urology advised starting Flomax.    #.  Essential hypertension  #.  CAD s/p CABG  - Markedly hypertensive without signs of endorgan damage  - Continue home aspirin, Coreg  - Holding home lisinopril given hyperkalemia as above  - May need alternative agent instead of lisinopril if persistent issues with hyperkalemia     #.  Type 2 diabetes  - Continuing home Jardiance       Total time spent on discharge planning and coordination of care 40 minutes.  Discharge planning was discussed with patient,  he understands and agrees with plan of care.    * Some of these notes were completed using Dragon voice recognition technology and may include unintended areas with respect to translation of word, typographical errors or primary areas which may not have been identified prior to finalization of the document.    DISCHARGE PHYSICAL EXAM     Last Recorded Vitals:  Vitals:    04/25/25 0150 04/25/25 0442 04/25/25 0721 04/25/25 0908   BP: (!) 165/92 121/87 119/78 121/82   BP Location: Left arm Left arm  Left arm   Patient Position: Sitting Lying Sitting Sitting   Pulse: 77 61 89 74   Resp: 16 17 16    Temp:   36.4 °C (97.5 °F)    TempSrc:       SpO2:  95% 98%    Weight:       Height:           Physical Exam  PHYSICAL EXAM:   GENERAL: Laying in bed, does not appear to be in any distress.   HEENT: HEAD: Normocephalic atraumatic.  Neck: Supple.  Eyes: Pupils are reactive to direct light.   CVS: S1, S2 heard. Regular rate and rhythm  LUNGS: Clear to auscultate bilaterally. No wheezing or rhonchi appreciated.  ABDOMEN: Soft, nontender to palpate. Positive bowel sounds. No guarding or rebound appreciated.  NEUROLOGICAL: No focal neurological deficits appreciated. Cranial nerves are grossly intact.  EXTREMITIES: Dorsalis pedis pulses can be appreciated. No edema appreciated.  SKIN:  Grossly intact, warm and dry.    DISCHARGE MEDICATIONS        Your medication list        CONTINUE taking these medications        Instructions Last Dose Given Next Dose Due   aspirin 81 mg EC tablet           carvedilol 25 mg tablet  Commonly known as: Coreg           empagliflozin-linagliptin 25-5 mg  Commonly known as: Glyxambi           finasteride 5 mg tablet  Commonly known as: Proscar           semaglutide 1 mg/dose (4 mg/3 mL) pen injector  Commonly known as: OZEMPIC                  STOP taking these medications      lisinopril 10 mg tablet        meloxicam 7.5 mg tablet  Commonly known as: Mobic                   OUTPATIENT FOLLOW-UP     No  future appointments.

## 2025-04-25 NOTE — ED PROVIDER NOTES
HPI   Chief Complaint   Patient presents with    abnormal labs     [facility] said [facility]       HPI: 79-year-old male states that [facility] and he was called and said that his potassium was high.  He states that he has had somewhat high potassium in the past.  He denies having any problems with his kidneys.  He states he takes blood pressure medications but is not sure the name of them.  Patient states that he was actually feeling well today and actually took an extra long walk.  He states he is not having any swelling in his legs.  No flank pain.  No chest pain or shortness of breath.  He has had a previous history of coronary artery disease with a CABG    Family HX: Denies any significant/pertinent family history.  Social Hx: Denies ETOH or drug use.  Review of systems:  Gen.: No weight loss, fatigue, anorexia, insomnia, fever.   Eyes: No vision loss, double vision, drainage, eye pain.   ENT: No pharyngitis, dry mouth.   Cardiac: No chest pain, palpitations, syncope, near syncope.   Pulmonary: No shortness of breath, cough, hemoptysis.   Heme/lymph: No swollen glands, fever, bleeding.   GI: No abdominal pain, change in bowel habits, melena, hematemesis, hematochezia, nausea, vomiting, diarrhea.   : No discharge, dysuria, frequency, urgency, hematuria.   Musculoskeletal: No limb pain, joint pain, joint swelling.   Skin: No rashes.   Psych: No depression, anxiety, suicidality, homicidality.   Review of systems is otherwise negative unless stated above or in history of present illness.    Physical Exam:    Appearance: Alert, oriented , cooperative,  in no acute distress. Well nourished & well hydrated.    Skin: Intact,  dry skin, no lesions, rash, petechiae or purpura.     Eyes: PERRLA, EOMs intact,  Conjunctiva pink with no redness or exudates. Eyelids without lesions. No scleral icterus.     ENT: Hearing grossly intact. External auditory canals patent,  tympanic membranes intact with visible landmarks. Nares patent, mucus membranes moist. Dentition without lesions. Pharynx clear, uvula midline.     Neck: Supple, without meningismus. Thyroid not palpable. Trachea at midline. No lymphadenopathy.    Pulmonary: Clear bilaterally with good chest wall excursion. No rales, rhonchi or wheezing. No accessory muscle use or stridor.    Cardiac: Normal S1, S2 without murmur, rub, gallop or extrasystole. No JVD, Carotids without bruits.    Abdomen: Soft, nontender, active bowel sounds.  No palpable organomegaly.  No rebound or guarding.  No CVA tenderness.    Genitourinary: Exam deferred.    Musculoskeletal: Full range of motion. no pain, edema, or deformity. Pulses full and equal. No cyanosis, clubbing, or edema.    Neurological:  Cranial nerves II through XII are grossly intact, finger-nose touch is normal, normal sensation, no weakness, no focal findings identified.    Psychiatric: Appropriate mood and affect.     Medical Decision-Making:  Testing: EKG was obtained to rule out signs of hyperkalemia.  Patient does not have any peaked T waves.  EKG is obtained as interpreted by me shows a sinus rhythm rate of 61 no evidence of obvious ST elevations or T wave inversions indicate acute ischemia or infarct.  No peaked T waves noted.  Labs show that the patient does have signs of hyperkalemia here with a potassium of 6.4 it is mildly hemolyzed.  We will recheck it and also initiate hyperkalemia protocol as the patient was sent here for hyperkalemia.  He has a mildly elevated creatinine.  He was given IV fluids as well.  Treatment: Will need to be admitted  Reevaluation:   Plan: Admit Patient and family/friend/caregiver are in agreement with this plan.   Impression:   1. Hyperkalemia    2.    Labs Reviewed  CBC WITH AUTO DIFFERENTIAL - Abnormal     WBC                           6.9                    nRBC                          0.0                    RBC                            4.55                   Hemoglobin                    13.6                   Hematocrit                    43.8                   MCV                           96                     MCH                           29.9                   MCHC                          31.1 (*)               RDW                           14.9 (*)               Platelets                     339                    Neutrophils %                 67.0                   Immature Granulocytes %, Automated   0.4                    Lymphocytes %                 19.8                   Monocytes %                   9.8                    Eosinophils %                 1.7                    Basophils %                   1.3                    Neutrophils Absolute          4.63                   Immature Granulocytes Absolute, Au*   0.03                   Lymphocytes Absolute          1.37                   Monocytes Absolute            0.68                   Eosinophils Absolute          0.12                   Basophils Absolute            0.09                COMPREHENSIVE METABOLIC PANEL - Abnormal     Glucose                       98                     Sodium                        137                    Potassium                     6.4 (*)                Chloride                      107                    Bicarbonate                   24                     Anion Gap                     12                     Urea Nitrogen                 28 (*)                 Creatinine                    1.36 (*)               eGFR                          53 (*)                 Calcium                       8.8                    Albumin                       4.3                    Alkaline Phosphatase          49                     Total Protein                 6.9                    AST                           21                     Bilirubin, Total              0.5                    ALT                           20                  PROTIME-INR -  Abnormal     Protime                       12.9 (*)               INR                           1.2 (*)             B-TYPE NATRIURETIC PEPTIDE - Abnormal     BNP                           284 (*)                  Narrative:    <100 pg/mL - Heart failure unlikely                100-299 pg/mL - Intermediate probability of acute heart                                failure exacerbation. Correlate with clinical                                context and patient history.                  >=300 pg/mL - Heart Failure likely. Correlate with clinical                                context and patient history.                                BNP testing is performed using different testing methodology at [facility] than at other [facility]. Direct result comparisons should only be made within the same method.                   MAGNESIUM - Normal     Magnesium                     1.67                LACTATE - Normal     Lactate                       0.5                      Narrative: Venipuncture immediately after or during the administration of Metamizole may lead to falsely low results. Testing should be performed immediately prior to Metamizole dosing.  SERIAL TROPONIN-INITIAL - Normal     Troponin I, High Sensitivity   5                        Narrative: Less than 99th percentile of normal range cutoff-                Female and children under 18 years old <14 ng/L; Male <21 ng/L: Negative                Repeat testing should be performed if clinically indicated.                                 Female and children under 18 years old 14-50 ng/L; Male 21-50 ng/L:                Consistent with possible cardiac damage and possible increased clinical                 risk. Serial measurements may help to assess extent of myocardial damage.                                 >50 ng/L: Consistent with cardiac damage, increased clinical risk and                myocardial infarction. Serial measurements may help  assess extent of                 myocardial damage.                                  NOTE: Children less than 1 year old may have higher baseline troponin                 levels and results should be interpreted in conjunction with the overall                 clinical context.                                 NOTE: Troponin I testing is performed using a different                 testing methodology at [facility] than at other                 [facility] hospitals. Direct result comparisons should only                 be made within the same method.  TROPONIN SERIES- (INITIAL, 1 HR)       Narrative: The following orders were created for panel order Troponin I Series, High Sensitivity (0, 1 HR).                Procedure                               Abnormality         Status                                   ---------                               -----------         ------                                   Troponin I, High Sensiti...[[id number]]  Normal              Final result                             Troponin, High Sensitivi...[[id number]]                                                                               Please view results for these tests on the individual orders.  SERIAL TROPONIN, 1 HOUR  POTASSIUM  POCT GLUCOSE METER     XR chest 1 view   Final Result    1. No acute cardiopulmonary process.          MACRO:    None.          Signed by: Annel Badillo 4/24/2025 9:22 PM    Dictation workstation:   [id number]                    Patient History   Medical History[1]  Surgical History[2]  Family History[3]  Social History[4]    Physical Exam   ED Triage Vitals [04/24/25 1948]   Temperature Heart Rate Respirations BP   36.2 °C (97.2 °F) 66 17 (!) 182/103      Pulse Ox Temp Source Heart Rate Source Patient Position   98 % Temporal Monitor Sitting      BP Location FiO2 (%)     Right arm --       Physical Exam      ED Course & MDM   Diagnoses as of 04/24/25 2141   Hyperkalemia                 No  data recorded     Florencia Coma Scale Score: 15 (04/24/25 1948 : Kalee Pena RN)                           Medical Decision Making      Procedure  Procedures       [1]   Past Medical History:  Diagnosis Date    Atherosclerotic heart disease of native coronary artery without angina pectoris     CAD, multiple vessel    Personal history of other diseases of the circulatory system     H/O: HTN (hypertension)    Personal history of other diseases of the circulatory system     History of atrial fibrillation    Personal history of other endocrine, nutritional and metabolic disease     History of diabetes mellitus    Personal history of other endocrine, nutritional and metabolic disease     History of high cholesterol   [2]   Past Surgical History:  Procedure Laterality Date    OTHER SURGICAL HISTORY  10/03/2021    Coronary artery bypass graft   [3] No family history on file.  [4]   Social History  Tobacco Use    Smoking status: Not on file    Smokeless tobacco: Not on file   Substance Use Topics    Alcohol use: Not on file    Drug use: Not on file        Leigha Asif MD  04/24/25 5661

## 2025-04-25 NOTE — CONSULTS
[facility]  Consult Note           Reason for Consult: Hyperkalemia acute kidney injury on top of chronic kidney disease versus chronic kidney disease stage IIIa  Requesting Physician:  Dr. Aaliyah Hylton MD      Chief Complaint: Abnormal blood work in the form of hyperkalemia  History Obtained From:  patient, electronic medical record    History of Present Ilness:    79 y.o. year old male who who is a patient of the [facility] system alicia blood the same day of presentation got a call from his doctors to come to the emergency department to be admitted because of high potassium potassium was 6.4 patient admitted for further evaluation and management with creatinine of 1.3 and GFR of 53 that was not associated with any other electrolyte or acid-base imbalance and no anemia and the patient was asymptomatic as he is concerned initially patient was hypertensive in hypertensive urgency with a blood pressure of 222/116  His home medication does include Jardiance 25 mg looking in the patient database he was on lisinopril 10 mg daily 12/26/2024    This clinical presentation did take place in a patient with chronic comorbidity of diabetes type 2 essential hypertension benign prostatic hypertrophy patient denied any recent contrast exposure or any nonsteroidal anti-inflammatory  Past Medical History:    Medical History[1]     Past Surgical History:    Surgical History[2]     Home Medications:    Medications Ordered Prior to Encounter[3]    Allergies:  Patient has no known allergies.    Social History:    Social History     Socioeconomic History    Marital status:      Spouse name: Not on file    Number of children: Not on file    Years of education: Not on file    Highest education level: Not on file   Occupational History    Not on file   Tobacco Use    Smoking status: Never    Smokeless tobacco: Never   Vaping Use    Vaping status: Never Used   Substance and Sexual Activity    Alcohol use:  Not on file    Drug use: Not on file    Sexual activity: Not on file   Other Topics Concern    Not on file   Social History Narrative    Not on file     Social Drivers of Health     Financial Resource Strain: Low Risk  (4/25/2025)    Overall Financial Resource Strain (CARDIA)     Difficulty of Paying Living Expenses: Not hard at all   Food Insecurity: No Food Insecurity (4/25/2025)    Hunger Vital Sign     Worried About Running Out of Food in the Last Year: Never true     Ran Out of Food in the Last Year: Never true   Transportation Needs: No Transportation Needs (4/25/2025)    PRAPARE - Transportation     Lack of Transportation (Medical): No     Lack of Transportation (Non-Medical): No   Physical Activity: Sufficiently Active (4/25/2025)    Exercise Vital Sign     Days of Exercise per Week: 7 days     Minutes of Exercise per Session: 110 min   Stress: No Stress Concern Present (4/25/2025)    [employer] [place] [employer] - Occupational Stress Questionnaire     Feeling of Stress : Not at all   Social Connections: Socially Isolated (4/25/2025)    Social Connection and Isolation Panel [NHANES]     Frequency of Communication with Friends and Family: Once a week     Frequency of Social Gatherings with Friends and Family: Once a week     Attends [employer] Services: Never     Active Member of Clubs or Organizations: No     Attends Club or Organization Meetings: Never     Marital Status:    Intimate Partner Violence: Not At Risk (4/25/2025)    Humiliation, Afraid, Rape, and Kick questionnaire     Fear of Current or Ex-Partner: No     Emotionally Abused: No     Physically Abused: No     Sexually Abused: No   Housing Stability: Low Risk  (4/25/2025)    Housing Stability Vital Sign     Unable to Pay for Housing in the Last Year: No     Number of Times Moved in the Last Year: 0     Homeless in the Last Year: No       Family History:   Family History[4]    Review of Systems:   No present complaint or concern  "review of 12 organ system negative      Physical exam:   Constitutional:  VITALS:  /82 (BP Location: Left arm, Patient Position: Sitting)   Pulse 74   Temp 36.4 °C (97.5 °F)   Resp 16   Ht 1.803 m (5' 11\")   Wt 77.1 kg (170 lb)   SpO2 98%   BMI 23.71 kg/m²      Wt Readings from Last 3 Encounters:   04/24/25 77.1 kg (170 lb)   09/20/24 80.7 kg (178 lb)   10/03/21 95.3 kg (210 lb)      Gen: alert, awake, nad  Head: atraumatic, normocephalic  Skin: no rash, turgor wnl  Heent:  eomi, mmm  Neck: no bruits or jvd noted, thyroid normal  Lungs:  clear to auscultation  Heart:  regular rate and rhythm, no murmurs  Abdomen:  +bs, soft, nt, nd, no hepatosplenomegaly   Extremities: no edema  Psychiatric: mood and affect appropriate; judgement and insight intact  Musculoskeletal:  Rom, muscular strength intact; digits, nails normal    Data/  CBC:   Lab Results   Component Value Date    WBC 7.0 04/25/2025    RBC 4.78 04/25/2025    HGB 14.1 04/25/2025    HCT 45.7 04/25/2025    MCV 96 04/25/2025    MCH 29.5 04/25/2025    MCHC 30.9 (L) 04/25/2025    RDW 14.9 (H) 04/25/2025     04/25/2025     BMP:    Lab Results   Component Value Date     04/25/2025    K 4.6 04/25/2025     04/25/2025    CO2 30 04/25/2025    BUN 27 (H) 04/25/2025    CREATININE 1.44 (H) 04/25/2025    CALCIUM 9.7 04/25/2025    GLUCOSE 77 04/25/2025     ECG 12 lead  Sinus rhythm with occasional Premature ventricular complexes and Premature atrial complexes  Inferior infarct (cited on or before 24-APR-2025)  Abnormal ECG  When compared with ECG of 24-APR-2025 22:46, (unconfirmed)  Premature ventricular complexes are now Present  Premature atrial complexes are now Present  Nonspecific T wave abnormality now evident in Lateral leads  ECG 12 lead  Normal sinus rhythm  Inferior infarct (cited on or before 24-APR-2025)  Abnormal ECG  When compared with ECG of 24-APR-2025 20:22, (unconfirmed)  No significant change was found  ECG 12 lead  Normal " "sinus rhythm with sinus arrhythmia  Inferior infarct (cited on or before 24-APR-2025)  Possible Anterior infarct , age undetermined  Abnormal ECG  When compared with ECG of 22-NOV-2022 11:57,  Previous ECG has undetermined rhythm, needs review    LFT:   Lab Results   Component Value Date    AST 21 04/24/2025    ALT 20 04/24/2025    ALKPHOS 49 04/24/2025    BILITOT 0.5 04/24/2025      Urinalysis: No results found for: \"CARLEY\", \"PROTUR\", \"GLUCOSEU\", \"BLOODU\", \"KETONESU\", \"BILIRUBINU\", \"NITRITEU\", \"LEUKOCYTESU\", \"UTPCR\"   Imaging: ECG 12 lead  Sinus rhythm with occasional Premature ventricular complexes and Premature atrial complexes  Inferior infarct (cited on or before 24-APR-2025)  Abnormal ECG  When compared with ECG of 24-APR-2025 22:46, (unconfirmed)  Premature ventricular complexes are now Present  Premature atrial complexes are now Present  Nonspecific T wave abnormality now evident in Lateral leads  ECG 12 lead  Normal sinus rhythm  Inferior infarct (cited on or before 24-APR-2025)  Abnormal ECG  When compared with ECG of 24-APR-2025 20:22, (unconfirmed)  No significant change was found  ECG 12 lead  Normal sinus rhythm with sinus arrhythmia  Inferior infarct (cited on or before 24-APR-2025)  Possible Anterior infarct , age undetermined  Abnormal ECG  When compared with ECG of 22-NOV-2022 11:57,  Previous ECG has undetermined rhythm, needs review           Assessment/  79 y.o. year old male who [facility] alicia blood the same day of presentation got a call from his doctors to come to the emergency department to be admitted because of high potassium potassium was 6.4 patient admitted for further evaluation and management with creatinine of 1.3 and GFR of 53 that was not associated with any other electrolyte or acid-base imbalance and no anemia and the patient was asymptomatic as he is concerned initially patient was hypertensive in hypertensive urgency with a blood pressure of " 222/116  His home medication does include Jardiance 25 mg looking in the patient database he was on lisinopril 10 mg daily 12/26/2024    This clinical presentation did take place in a patient with chronic comorbidity of diabetes type 2 essential hypertension benign prostatic hypertrophy patient denied any recent contrast exposure or any nonsteroidal anti-inflammatory      Plan/  Will check labs renal ultrasound  No need for therapeutic intervention from the kidney standpoint today patient blood pressure is controlled he is euvolemic potassium within normal and patient not on ACE or ARB or potassium sparing diuretics  Outpatient follow up from renal standpoint: TBD    Thank you for the consultation.  Please do not hesitate to call with questions.    Anupam Olson MD                   [1]   Past Medical History:  Diagnosis Date    Atherosclerotic heart disease of native coronary artery without angina pectoris     CAD, multiple vessel    Personal history of other diseases of the circulatory system     H/O: HTN (hypertension)    Personal history of other diseases of the circulatory system     History of atrial fibrillation    Personal history of other endocrine, nutritional and metabolic disease     History of diabetes mellitus    Personal history of other endocrine, nutritional and metabolic disease     History of high cholesterol   [2]   Past Surgical History:  Procedure Laterality Date    OTHER SURGICAL HISTORY  10/03/2021    Coronary artery bypass graft   [3]   No current facility-administered medications on file prior to encounter.     Current Outpatient Medications on File Prior to Encounter   Medication Sig Dispense Refill    aspirin 81 mg EC tablet Take 1 tablet (81 mg) by mouth once daily.      carvedilol (Coreg) 25 mg tablet Take 1 tablet (25 mg) by mouth 2 times a day.      empagliflozin-linagliptin (Glyxambi) 25-5 mg Take 1 tablet by mouth once daily.      finasteride (Proscar) 5 mg tablet Take 1 tablet (5  mg) by mouth.      lisinopril 10 mg tablet Take 1 tablet (10 mg) by mouth.      meloxicam (Mobic) 7.5 mg tablet Take 1 tablet (7.5 mg) by mouth once daily. 15 tablet 0    semaglutide (OZEMPIC) 1 mg/dose (4 mg/3 mL) pen injector Inject under the skin.     [4] No family history on file.

## 2025-04-25 NOTE — ED PROCEDURE NOTE
Procedure  Critical Care    Performed by: Leigha Asif MD  Authorized by: Leigha Asif MD    Critical care provider statement:     Critical care time (minutes):  35    Critical care time was exclusive of:  Separately billable procedures and treating other patients    Critical care was necessary to treat or prevent imminent or life-threatening deterioration of the following conditions:  Metabolic crisis    Critical care was time spent personally by me on the following activities:  Ordering and performing treatments and interventions, ordering and review of laboratory studies, ordering and review of radiographic studies, pulse oximetry, re-evaluation of patient's condition, review of old charts, examination of patient and evaluation of patient's response to treatment    Care discussed with: admitting provider                 Leigha Asif MD  04/24/25 0839

## 2025-04-25 NOTE — PROGRESS NOTES
04/25/25 0900   Discharge Planning   Living Arrangements Children;Family members  (Daughter and 3 grand children live with him)   Support Systems Children;Family members;Friends/neighbors   Assistance Needed none   Type of Residence Private residence   Number of Stairs to Enter Residence 3   Number of Stairs Within Residence 0   Do you have animals or pets at home? Yes   Type of Animals or Pets 1 dog   Who is requesting discharge planning? Provider   Home or Post Acute Services None   Expected Discharge Disposition Home   Does the patient need discharge transport arranged? No   Financial Resource Strain   How hard is it for you to pay for the very basics like food, housing, medical care, and heating? Not hard   Housing Stability   In the last 12 months, was there a time when you were not able to pay the mortgage or rent on time? N   In the past 12 months, how many times have you moved where you were living? 0   At any time in the past 12 months, were you homeless or living in a shelter (including now)? N   Transportation Needs   In the past 12 months, has lack of transportation kept you from medical appointments or from getting medications? no   In the past 12 months, has lack of transportation kept you from meetings, work, or from getting things needed for daily living? No   Stroke Family Assessment   Stroke Family Assessment Needed No   Intensity of Service   Intensity of Service 0-30 min     Spoke with pt explained TCC role in Care Transitions and verified address, phone number and emergency contact information. [facility], denies issues obtaining or affording medications and takes as ordered. Pt is independent, lives at home and daughter and 3 grand children live with him, he feels safe.  Pt uses a glucometer and a BP cuff. He checks his blood sugar every morning and occasionally in the evening. No DME, oxygen or agency in use. Plan is to return home no new needs. [facility] 24/24. ADOD  is today. CT to follow. Marlena Haq BSN/RN-TCC

## 2025-04-25 NOTE — CARE PLAN
The patient's goals for the shift include to get rest and take care of myself    The clinical goals for the shift include hemodynamically stable until the end of the shift    Over the shift, the patient did make progress toward the following goals.     Problem: Safety - Adult  Goal: Free from fall injury  Outcome: Progressing     Problem: Chronic Conditions and Co-morbidities  Goal: Patient's chronic conditions and co-morbidity symptoms are monitored and maintained or improved  Outcome: Progressing     Problem: Nutrition  Goal: Nutrient intake appropriate for maintaining nutritional needs  Outcome: Progressing     Problem: Pain - Adult  Goal: Verbalizes/displays adequate comfort level or baseline comfort level  Outcome: Progressing

## 2025-04-25 NOTE — CARE PLAN
The patient's goals for the shift include to get rest and take care of myself    The clinical goals for the shift include Pt will remain hmodynamically stable throughout shift

## 2025-04-26 LAB
ALBUMIN SERPL BCP-MCNC: 4 G/DL (ref 3.4–5)
ANION GAP SERPL CALC-SCNC: 10 MMOL/L (ref 10–20)
ANION GAP SERPL CALC-SCNC: 11 MMOL/L (ref 10–20)
APPEARANCE UR: CLEAR
BILIRUB UR STRIP.AUTO-MCNC: NEGATIVE MG/DL
BUN SERPL-MCNC: 31 MG/DL (ref 6–23)
BUN SERPL-MCNC: 32 MG/DL (ref 6–23)
CALCIUM SERPL-MCNC: 8.9 MG/DL (ref 8.6–10.3)
CALCIUM SERPL-MCNC: 9.1 MG/DL (ref 8.6–10.3)
CHLORIDE SERPL-SCNC: 104 MMOL/L (ref 98–107)
CHLORIDE SERPL-SCNC: 104 MMOL/L (ref 98–107)
CO2 SERPL-SCNC: 29 MMOL/L (ref 21–32)
CO2 SERPL-SCNC: 29 MMOL/L (ref 21–32)
COLOR UR: ABNORMAL
CREAT SERPL-MCNC: 1.54 MG/DL (ref 0.5–1.3)
CREAT SERPL-MCNC: 1.63 MG/DL (ref 0.5–1.3)
EGFRCR SERPLBLD CKD-EPI 2021: 43 ML/MIN/1.73M*2
EGFRCR SERPLBLD CKD-EPI 2021: 46 ML/MIN/1.73M*2
GLUCOSE BLD MANUAL STRIP-MCNC: 119 MG/DL (ref 74–99)
GLUCOSE SERPL-MCNC: 118 MG/DL (ref 74–99)
GLUCOSE SERPL-MCNC: 77 MG/DL (ref 74–99)
GLUCOSE UR STRIP.AUTO-MCNC: ABNORMAL MG/DL
HOLD SPECIMEN: NORMAL
HOLD SPECIMEN: NORMAL
KETONES UR STRIP.AUTO-MCNC: NEGATIVE MG/DL
LEUKOCYTE ESTERASE UR QL STRIP.AUTO: ABNORMAL
MAGNESIUM SERPL-MCNC: 1.99 MG/DL (ref 1.6–2.4)
MUCOUS THREADS #/AREA URNS AUTO: NORMAL /LPF
NITRITE UR QL STRIP.AUTO: NEGATIVE
PH UR STRIP.AUTO: 6 [PH]
PHOSPHATE SERPL-MCNC: 4.3 MG/DL (ref 2.5–4.9)
POTASSIUM SERPL-SCNC: 4.7 MMOL/L (ref 3.5–5.3)
POTASSIUM SERPL-SCNC: 5 MMOL/L (ref 3.5–5.3)
PROT UR STRIP.AUTO-MCNC: ABNORMAL MG/DL
PSA SERPL-MCNC: 1.57 NG/ML
RBC # UR STRIP.AUTO: NEGATIVE MG/DL
RBC #/AREA URNS AUTO: NORMAL /HPF
SODIUM SERPL-SCNC: 138 MMOL/L (ref 136–145)
SODIUM SERPL-SCNC: 139 MMOL/L (ref 136–145)
SP GR UR STRIP.AUTO: 1.01
UROBILINOGEN UR STRIP.AUTO-MCNC: NORMAL MG/DL
WBC #/AREA URNS AUTO: NORMAL /HPF

## 2025-04-26 PROCEDURE — 36415 COLL VENOUS BLD VENIPUNCTURE: CPT | Performed by: HOSPITALIST

## 2025-04-26 PROCEDURE — 81003 URINALYSIS AUTO W/O SCOPE: CPT | Performed by: HOSPITALIST

## 2025-04-26 PROCEDURE — 2500000001 HC RX 250 WO HCPCS SELF ADMINISTERED DRUGS (ALT 637 FOR MEDICARE OP): Performed by: STUDENT IN AN ORGANIZED HEALTH CARE EDUCATION/TRAINING PROGRAM

## 2025-04-26 PROCEDURE — 2500000004 HC RX 250 GENERAL PHARMACY W/ HCPCS (ALT 636 FOR OP/ED): Mod: JZ | Performed by: HOSPITALIST

## 2025-04-26 PROCEDURE — 80048 BASIC METABOLIC PNL TOTAL CA: CPT | Mod: CCI | Performed by: HOSPITALIST

## 2025-04-26 PROCEDURE — 84100 ASSAY OF PHOSPHORUS: CPT | Performed by: INTERNAL MEDICINE

## 2025-04-26 PROCEDURE — 99232 SBSQ HOSP IP/OBS MODERATE 35: CPT | Performed by: HOSPITALIST

## 2025-04-26 PROCEDURE — 2500000002 HC RX 250 W HCPCS SELF ADMINISTERED DRUGS (ALT 637 FOR MEDICARE OP, ALT 636 FOR OP/ED): Performed by: HOSPITALIST

## 2025-04-26 PROCEDURE — 1200000002 HC GENERAL ROOM WITH TELEMETRY DAILY

## 2025-04-26 PROCEDURE — 83735 ASSAY OF MAGNESIUM: CPT | Performed by: HOSPITALIST

## 2025-04-26 PROCEDURE — 87086 URINE CULTURE/COLONY COUNT: CPT | Mod: ELYLAB | Performed by: HOSPITALIST

## 2025-04-26 PROCEDURE — 82947 ASSAY GLUCOSE BLOOD QUANT: CPT

## 2025-04-26 RX ORDER — TAMSULOSIN HYDROCHLORIDE 0.4 MG/1
0.4 CAPSULE ORAL DAILY
Status: DISCONTINUED | OUTPATIENT
Start: 2025-04-26 | End: 2025-04-27 | Stop reason: HOSPADM

## 2025-04-26 RX ADMIN — ASPIRIN 81 MG: 81 TABLET, COATED ORAL at 08:23

## 2025-04-26 RX ADMIN — SODIUM CHLORIDE 1000 ML: 0.9 INJECTION, SOLUTION INTRAVENOUS at 09:03

## 2025-04-26 RX ADMIN — EMPAGLIFLOZIN 25 MG: 25 TABLET, FILM COATED ORAL at 08:23

## 2025-04-26 RX ADMIN — TAMSULOSIN HYDROCHLORIDE 0.4 MG: 0.4 CAPSULE ORAL at 12:20

## 2025-04-26 RX ADMIN — CARVEDILOL 25 MG: 12.5 TABLET, FILM COATED ORAL at 19:44

## 2025-04-26 RX ADMIN — FINASTERIDE 5 MG: 5 TABLET, FILM COATED ORAL at 08:23

## 2025-04-26 RX ADMIN — CARVEDILOL 25 MG: 12.5 TABLET, FILM COATED ORAL at 08:23

## 2025-04-26 ASSESSMENT — COGNITIVE AND FUNCTIONAL STATUS - GENERAL
MOBILITY SCORE: 24
DAILY ACTIVITIY SCORE: 24
DAILY ACTIVITIY SCORE: 24
MOBILITY SCORE: 24

## 2025-04-26 ASSESSMENT — PAIN SCALES - GENERAL
PAINLEVEL_OUTOF10: 0 - NO PAIN
PAINLEVEL_OUTOF10: 0 - NO PAIN

## 2025-04-26 NOTE — CARE PLAN
The patient's goals for the shift include to get rest and take care of myself    The clinical goals for the shift include Pt will remain hmodynamically stable throughout shift    Over the shift, the patient did not make progress toward the following goals. Barriers to progression include understand poc. Recommendations to address these barriers include education.

## 2025-04-26 NOTE — CONSULTS
UROLOGY CONSULT NOTE FOR SATURDAY, 4/26/2025    SUBJECTIVE: Pleasant 79-year-old white male initially admitted through the emergency room for hyperkalemia and hypertensive episodes and now referred urologically for prostatic enlargement  [facility]  Clinically patient reports to me that he has a generally fair to good urinary stream, does have bouts of early morning urinary hesitancy with some urgency frequency otherwise urine has been clear with no dysuria no hematuria no signs of infection  I am unable to access past records electronically, patient verbally reports to me that he [facility] [place] [facility] and he recalls that he was told he took 12 samples and they were negative presumably prostate biopsy with no malignancy findings  Additional medical comorbidities as listed and reviewed     OBJECTIVE:   On today's visit the patient is sitting at the bedside, comfortable no complaints abdomen soft benign no flank tenderness  Mild renal insufficiency with serum creatinine 1.54 and nephrology is following as well  CBC normal  Vitamin D level very low at 8  Urine culture sensitivity pending  Currently on finasteride and tamsulosin  Renal bladder ultrasound reviewed, kidneys unremarkable bladder wall is described as thickened and prostate enlargement is present and on my personal review the sagittal views show some significant prostate enlargement protruding into the bladder neck area.  Cannot find any PSA levels for comparison    ASSESSMENT/PLAN    By clinical history BPH with moderate symptoms maintain medically on finasteride and tamsulosin  For current urological evaluation we will order a PSA level screening and a prostate ultrasound and review and advise further    Thank you for allowing us to participate in the patient's care and management and we will follow along urologically    Additional medical and historical objective  data reviewed and listed below    Current Facility-Administered Medications:     acetaminophen (Tylenol) tablet 650 mg, 650 mg, oral, q4h PRN **OR** acetaminophen (Tylenol) oral liquid 650 mg, 650 mg, nasogastric tube, q4h PRN **OR** acetaminophen (Tylenol) suppository 650 mg, 650 mg, rectal, q4h PRN, Khoi De Luna MD    aspirin EC tablet 81 mg, 81 mg, oral, Daily, Khoi De Luna MD, 81 mg at 04/26/25 0823    carvedilol (Coreg) tablet 25 mg, 25 mg, oral, BID, Khoi De Luna MD, 25 mg at 04/26/25 0823    dextrose 50 % injection 12.5 g, 12.5 g, intravenous, q15 min PRN, Khoi De Luna MD    dextrose 50 % injection 25 g, 25 g, intravenous, q15 min PRN, Khoi De Luna MD    empagliflozin (Jardiance) tablet 25 mg, 25 mg, oral, Daily, Khoi De Luna MD, 25 mg at 04/26/25 0823    finasteride (Proscar) tablet 5 mg, 5 mg, oral, Daily, Khoi De Luna MD, 5 mg at 04/26/25 0823    glucagon (Glucagen) injection 1 mg, 1 mg, intramuscular, q15 min PRN, Khoi De Luna MD    glucagon (Glucagen) injection 1 mg, 1 mg, intramuscular, q15 min PRN, Khoi De Luna MD    melatonin tablet 3 mg, 3 mg, oral, Nightly PRN, Khoi De Luna MD    ondansetron (Zofran) tablet 4 mg, 4 mg, oral, q8h PRN **OR** ondansetron (Zofran) injection 4 mg, 4 mg, intravenous, q8h PRN, Khoi De Luna MD    polyethylene glycol (Glycolax, Miralax) packet 17 g, 17 g, oral, Daily, Khoi De Luna MD    tamsulosin (Flomax) 24 hr capsule 0.4 mg, 0.4 mg, oral, Daily, Aaliyah Hylton MD, 0.4 mg at 04/26/25 1220      Results for orders placed or performed during the hospital encounter of 04/24/25 (from the past 96 hours)   ECG 12 lead   Result Value Ref Range    Ventricular Rate 61 BPM    Atrial Rate 61 BPM    WA Interval 164 ms    QRS Duration 64 ms    QT Interval 412 ms    QTC Calculation(Bazett) 414 ms    R Axis -4 degrees    T Axis 27 degrees    QRS Count 10 beats    Q Onset 230 ms    P Onset 148 ms    P  Offset 196 ms    T Offset 436 ms    QTC Fredericia 414 ms   CBC and Auto Differential   Result Value Ref Range    WBC 6.9 4.4 - 11.3 x10*3/uL    nRBC 0.0 0.0 - 0.0 /100 WBCs    RBC 4.55 4.50 - 5.90 x10*6/uL    Hemoglobin 13.6 13.5 - 17.5 g/dL    Hematocrit 43.8 41.0 - 52.0 %    MCV 96 80 - 100 fL    MCH 29.9 26.0 - 34.0 pg    MCHC 31.1 (L) 32.0 - 36.0 g/dL    RDW 14.9 (H) 11.5 - 14.5 %    Platelets 339 150 - 450 x10*3/uL    Neutrophils % 67.0 40.0 - 80.0 %    Immature Granulocytes %, Automated 0.4 0.0 - 0.9 %    Lymphocytes % 19.8 13.0 - 44.0 %    Monocytes % 9.8 2.0 - 10.0 %    Eosinophils % 1.7 0.0 - 6.0 %    Basophils % 1.3 0.0 - 2.0 %    Neutrophils Absolute 4.63 1.60 - 5.50 x10*3/uL    Immature Granulocytes Absolute, Automated 0.03 0.00 - 0.50 x10*3/uL    Lymphocytes Absolute 1.37 0.80 - 3.00 x10*3/uL    Monocytes Absolute 0.68 0.05 - 0.80 x10*3/uL    Eosinophils Absolute 0.12 0.00 - 0.40 x10*3/uL    Basophils Absolute 0.09 0.00 - 0.10 x10*3/uL   Comprehensive Metabolic Panel   Result Value Ref Range    Glucose 98 74 - 99 mg/dL    Sodium 137 136 - 145 mmol/L    Potassium 6.4 (HH) 3.5 - 5.3 mmol/L    Chloride 107 98 - 107 mmol/L    Bicarbonate 24 21 - 32 mmol/L    Anion Gap 12 10 - 20 mmol/L    Urea Nitrogen 28 (H) 6 - 23 mg/dL    Creatinine 1.36 (H) 0.50 - 1.30 mg/dL    eGFR 53 (L) >60 mL/min/1.73m*2    Calcium 8.8 8.6 - 10.3 mg/dL    Albumin 4.3 3.4 - 5.0 g/dL    Alkaline Phosphatase 49 33 - 136 U/L    Total Protein 6.9 6.4 - 8.2 g/dL    AST 21 9 - 39 U/L    Bilirubin, Total 0.5 0.0 - 1.2 mg/dL    ALT 20 10 - 52 U/L   Magnesium   Result Value Ref Range    Magnesium 1.67 1.60 - 2.40 mg/dL   Lactate   Result Value Ref Range    Lactate 0.5 0.4 - 2.0 mmol/L   Protime-INR   Result Value Ref Range    Protime 12.9 (H) 9.8 - 12.4 seconds    INR 1.2 (H) 0.9 - 1.1   B-Type Natriuretic Peptide   Result Value Ref Range     (H) 0 - 99 pg/mL   Troponin I, High Sensitivity, Initial   Result Value Ref Range    Troponin  I, High Sensitivity 5 0 - 20 ng/L   Troponin, High Sensitivity, 1 Hour   Result Value Ref Range    Troponin I, High Sensitivity 5 0 - 20 ng/L   Potassium   Result Value Ref Range    Potassium 5.9 (H) 3.5 - 5.3 mmol/L   ECG 12 lead   Result Value Ref Range    Ventricular Rate 71 BPM    Atrial Rate 71 BPM    MS Interval 174 ms    QRS Duration 74 ms    QT Interval 378 ms    QTC Calculation(Bazett) 410 ms    P Axis -26 degrees    R Axis -14 degrees    T Axis 11 degrees    QRS Count 12 beats    Q Onset 228 ms    P Onset 141 ms    P Offset 196 ms    T Offset 417 ms    QTC Fredericia 400 ms   POCT GLUCOSE   Result Value Ref Range    POCT Glucose 149 (H) 74 - 99 mg/dL   POCT GLUCOSE   Result Value Ref Range    POCT Glucose 77 74 - 99 mg/dL   SST TOP   Result Value Ref Range    Extra Tube Hold for add-ons.    CBC   Result Value Ref Range    WBC 7.0 4.4 - 11.3 x10*3/uL    nRBC 0.0 0.0 - 0.0 /100 WBCs    RBC 4.78 4.50 - 5.90 x10*6/uL    Hemoglobin 14.1 13.5 - 17.5 g/dL    Hematocrit 45.7 41.0 - 52.0 %    MCV 96 80 - 100 fL    MCH 29.5 26.0 - 34.0 pg    MCHC 30.9 (L) 32.0 - 36.0 g/dL    RDW 14.9 (H) 11.5 - 14.5 %    Platelets 376 150 - 450 x10*3/uL   Basic metabolic panel   Result Value Ref Range    Glucose 77 74 - 99 mg/dL    Sodium 141 136 - 145 mmol/L    Potassium 4.6 3.5 - 5.3 mmol/L    Chloride 105 98 - 107 mmol/L    Bicarbonate 30 21 - 32 mmol/L    Anion Gap 11 10 - 20 mmol/L    Urea Nitrogen 27 (H) 6 - 23 mg/dL    Creatinine 1.44 (H) 0.50 - 1.30 mg/dL    eGFR 50 (L) >60 mL/min/1.73m*2    Calcium 9.7 8.6 - 10.3 mg/dL   Magnesium   Result Value Ref Range    Magnesium 1.56 (L) 1.60 - 2.40 mg/dL   POCT GLUCOSE   Result Value Ref Range    POCT Glucose 95 74 - 99 mg/dL   ECG 12 lead   Result Value Ref Range    Ventricular Rate 74 BPM    Atrial Rate 74 BPM    MS Interval 166 ms    QRS Duration 84 ms    QT Interval 384 ms    QTC Calculation(Bazett) 426 ms    P Axis 8 degrees    R Axis -6 degrees    T Axis 8 degrees    QRS Count  12 beats    Q Onset 213 ms    P Onset 130 ms    P Offset 195 ms    T Offset 405 ms    QTC Fredericia 412 ms   Basic metabolic panel   Result Value Ref Range    Glucose 111 (H) 74 - 99 mg/dL    Sodium 139 136 - 145 mmol/L    Potassium 5.1 3.5 - 5.3 mmol/L    Chloride 104 98 - 107 mmol/L    Bicarbonate 31 21 - 32 mmol/L    Anion Gap 9 (L) 10 - 20 mmol/L    Urea Nitrogen 29 (H) 6 - 23 mg/dL    Creatinine 1.52 (H) 0.50 - 1.30 mg/dL    eGFR 47 (L) >60 mL/min/1.73m*2    Calcium 9.4 8.6 - 10.3 mg/dL   TSH with reflex to Free T4 if abnormal   Result Value Ref Range    Thyroid Stimulating Hormone 2.00 0.44 - 3.98 mIU/L   PTH, Intact   Result Value Ref Range    Parathyroid Hormone, Intact 84.5 18.5 - 88.0 pg/mL   Vitamin D 25-Hydroxy,Total (for eval of Vitamin D levels)   Result Value Ref Range    Vitamin D, 25-Hydroxy, Total 8 (L) 30 - 100 ng/mL   Urinalysis with Reflex Microscopic   Result Value Ref Range    Color, Urine Light-Yellow Light-Yellow, Yellow, Dark-Yellow    Appearance, Urine Clear Clear    Specific Gravity, Urine 1.008 1.005 - 1.035    pH, Urine 5.5 5.0, 5.5, 6.0, 6.5, 7.0, 7.5, 8.0    Protein, Urine 10 (TRACE) NEGATIVE, 10 (TRACE), 20 (TRACE) mg/dL    Glucose, Urine 1000 (4+) (A) Normal mg/dL    Blood, Urine NEGATIVE NEGATIVE mg/dL    Ketones, Urine NEGATIVE NEGATIVE mg/dL    Bilirubin, Urine NEGATIVE NEGATIVE mg/dL    Urobilinogen, Urine Normal Normal mg/dL    Nitrite, Urine NEGATIVE NEGATIVE    Leukocyte Esterase, Urine NEGATIVE NEGATIVE   Albumin-Creatinine Ratio, Urine Random   Result Value Ref Range    Albumin, Urine Random 164.3 Not established mg/L    Creatinine, Urine Random 41.6 20.0 - 370.0 mg/dL    Albumin/Creatinine Ratio 395.0 (H) <30.0 ug/mg Creat   Microscopic Only, Urine   Result Value Ref Range    WBC, Urine 1-5 1-5, NONE /HPF    RBC, Urine NONE NONE, 1-2, 3-5 /HPF    Bacteria, Urine 1+ (A) NONE SEEN /HPF   POCT GLUCOSE   Result Value Ref Range    POCT Glucose 101 (H) 74 - 99 mg/dL   POCT  GLUCOSE   Result Value Ref Range    POCT Glucose 131 (H) 74 - 99 mg/dL   Renal Function Panel   Result Value Ref Range    Glucose 77 74 - 99 mg/dL    Sodium 139 136 - 145 mmol/L    Potassium 4.7 3.5 - 5.3 mmol/L    Chloride 104 98 - 107 mmol/L    Bicarbonate 29 21 - 32 mmol/L    Anion Gap 11 10 - 20 mmol/L    Urea Nitrogen 31 (H) 6 - 23 mg/dL    Creatinine 1.63 (H) 0.50 - 1.30 mg/dL    eGFR 43 (L) >60 mL/min/1.73m*2    Calcium 9.1 8.6 - 10.3 mg/dL    Phosphorus 4.3 2.5 - 4.9 mg/dL    Albumin 4.0 3.4 - 5.0 g/dL   Lavender Top   Result Value Ref Range    Extra Tube Hold for add-ons.    POCT GLUCOSE   Result Value Ref Range    POCT Glucose 119 (H) 74 - 99 mg/dL   Urinalysis with Reflex Culture and Microscopic   Result Value Ref Range    Color, Urine Light-Yellow Light-Yellow, Yellow, Dark-Yellow    Appearance, Urine Clear Clear    Specific Gravity, Urine 1.010 1.005 - 1.035    pH, Urine 6.0 5.0, 5.5, 6.0, 6.5, 7.0, 7.5, 8.0    Protein, Urine 20 (TRACE) NEGATIVE, 10 (TRACE), 20 (TRACE) mg/dL    Glucose, Urine OVER (4+) (A) Normal mg/dL    Blood, Urine NEGATIVE NEGATIVE mg/dL    Ketones, Urine NEGATIVE NEGATIVE mg/dL    Bilirubin, Urine NEGATIVE NEGATIVE mg/dL    Urobilinogen, Urine Normal Normal mg/dL    Nitrite, Urine NEGATIVE NEGATIVE    Leukocyte Esterase, Urine 25 Ruperto/uL (A) NEGATIVE   Microscopic Only, Urine   Result Value Ref Range    WBC, Urine 1-5 1-5, NONE /HPF    RBC, Urine 1-2 NONE, 1-2, 3-5 /HPF    Mucus, Urine FEW Reference range not established. /LPF   Basic metabolic panel   Result Value Ref Range    Glucose 118 (H) 74 - 99 mg/dL    Sodium 138 136 - 145 mmol/L    Potassium 5.0 3.5 - 5.3 mmol/L    Chloride 104 98 - 107 mmol/L    Bicarbonate 29 21 - 32 mmol/L    Anion Gap 10 10 - 20 mmol/L    Urea Nitrogen 32 (H) 6 - 23 mg/dL    Creatinine 1.54 (H) 0.50 - 1.30 mg/dL    eGFR 46 (L) >60 mL/min/1.73m*2    Calcium 8.9 8.6 - 10.3 mg/dL      renal complete  Result Date: 4/25/2025  Interpreted By:  Dorothea  Meme, STUDY: US RENAL COMPLETE;  4/25/2025 7:25 pm   INDICATION: Signs/Symptoms:Acute kidney injury hyperkalemia patient of the [facility] system presented with hyperkalemia and acute kidney injury with history of diabetes and hypertension and diabetic urgency.     COMPARISON: CT abdomen and pelvis 10/07/2021.   ACCESSION NUMBER(S): [id number]   ORDERING CLINICIAN: BROOKE KLEIN   TECHNIQUE: Multiple images of the kidneys were obtained  .   FINDINGS: RIGHT KIDNEY: The right kidney measures 11.7cm  in length. The renal cortical echogenicity and thickness are within normal limits. No hydronephrosis is present.   LEFT KIDNEY: The left kidney measures 11.7cm  in length. The renal cortical echogenicity and thickness are within normal limits. No hydronephrosis is present.   BLADDER: The urinary bladder is partially distended with circumferential wall thickening measuring 0.5 cm.   The prostate has a nodular contour indenting the base of the urinary bladder. The prostate measures 6.7 x 5.8 x 6.1 cm.       1. No hydronephrosis. 2. Circumferential wall thickening of the urinary bladder may be secondary to underdistention or chronic outlet obstruction. Please correlate with urinalysis to exclude superimposed cystitis. 3. Prostatomegaly. Nodular contour of the prostate indents the base of the urinary bladder.       MACRO: None     Signed by: Meme Piedra 4/25/2025 8:53 PM Dictation workstation:   [id number]    ECG 12 lead  Result Date: 4/25/2025  Sinus rhythm with occasional Premature ventricular complexes and Premature atrial complexes Inferior infarct (cited on or before 24-APR-2025) Abnormal ECG When compared with ECG of 24-APR-2025 22:46, (unconfirmed) Premature ventricular complexes are now Present Premature atrial complexes are now Present Nonspecific T wave abnormality now evident in Lateral leads Confirmed by Mian Stringer ([id number]) on 4/25/2025 3:11:19 PM    ECG 12 lead  Result Date:  4/25/2025  Normal sinus rhythm Inferior infarct (cited on or before 24-APR-2025) Abnormal ECG When compared with ECG of 24-APR-2025 20:22, (unconfirmed) No significant change was found See ED provider note for full interpretation and clinical correlation Confirmed by Cami Pennington ([id number]) on 4/25/2025 2:53:52 PM    ECG 12 lead  Result Date: 4/25/2025  Normal sinus rhythm with sinus arrhythmia Inferior infarct (cited on or before 24-APR-2025) Possible Anterior infarct , age undetermined Abnormal ECG When compared with ECG of 22-NOV-2022 11:57, Previous ECG has undetermined rhythm, needs review See ED provider note for full interpretation and clinical correlation Confirmed by Cami Pennington ([id number]) on 4/25/2025 2:45:27 PM    XR chest 1 view  Result Date: 4/24/2025  Interpreted By:  Annel Badillo, STUDY: Chest, single AP view.   INDICATION: Signs/Symptoms:Chest Pain.   COMPARISON: 10/07/2021.   ACCESSION NUMBER(S): [id number]   ORDERING CLINICIAN: RIP YEN   FINDINGS: Median sternotomy. The cardiac silhouette size is within normal limits. Atelectatic changes of the lung bases.   There is no focal consolidation, edema or pneumothorax. No sizeable pleural effusion. No acute osseous abnormality.       1. No acute cardiopulmonary process.   MACRO: None.   Signed by: Annel Badillo 4/24/2025 9:22 PM Dictation workstation:   [id number]

## 2025-04-26 NOTE — PROGRESS NOTES
04/26/25 1103   Discharge Planning   Expected Discharge Disposition Home     [facility] [place], no needs.

## 2025-04-26 NOTE — CARE PLAN
The patient's goals for the shift include to get rest and take care of myself    The clinical goals for the shift include hemodynamically stable

## 2025-04-26 NOTE — PROGRESS NOTES
Nephrology Progress Note    Assessment:  79 y.o. year old male who who is a patient of the [facility] system alicia blood the same day of presentation got a call from his doctors to come to the emergency department to be admitted because of high potassium potassium was 6.4 patient admitted for further evaluation and management with creatinine of 1.3 and GFR of 53 that was not associated with any other electrolyte or acid-base imbalance and no anemia and the patient was asymptomatic as he is concerned initially patient was hypertensive in hypertensive urgency with a blood pressure of 222/116  His home medication does include Jardiance 25 mg looking in the patient database he was on lisinopril 10 mg daily 12/26/2024     This clinical presentation did take place in a patient with chronic comorbidity of diabetes type 2 essential hypertension benign prostatic hypertrophy patient denied any recent contrast exposure or any nonsteroidal anti-inflammatory        Plan:  - agree w/ urology consult to address urinary retention   - no changes from renal standpoint   - will plan to have him follow up with Dr. Olson on discharge in 2 weeks        Subjective:  Admit Date: 4/24/2025    Interval History: Scr continues to slowly trend up, found to have urinary retention so discharge held up     Medications:  Scheduled Meds:Scheduled Medications[1]  Continuous Infusions:Continuous Medications[2]    CBC:   Lab Results   Component Value Date    WBC 7.0 04/25/2025    RBC 4.78 04/25/2025    HGB 14.1 04/25/2025    HCT 45.7 04/25/2025    MCV 96 04/25/2025    MCH 29.5 04/25/2025    MCHC 30.9 (L) 04/25/2025    RDW 14.9 (H) 04/25/2025     04/25/2025     BMP:    Lab Results   Component Value Date     04/26/2025    K 5.0 04/26/2025     04/26/2025    CO2 29 04/26/2025    BUN 32 (H) 04/26/2025    CREATININE 1.54 (H) 04/26/2025    CALCIUM 8.9 04/26/2025    GLUCOSE 118 (H) 04/26/2025       Objective:  Vitals: /78 (BP  "Location: Left arm, Patient Position: Lying)   Pulse 54   Temp 36.3 °C (97.3 °F) (Temporal)   Resp 18   Ht 1.803 m (5' 11\")   Wt 77.1 kg (170 lb)   SpO2 98%   BMI 23.71 kg/m²    Wt Readings from Last 3 Encounters:   04/24/25 77.1 kg (170 lb)   09/20/24 80.7 kg (178 lb)   10/03/21 95.3 kg (210 lb)      24HR INTAKE/OUTPUT:    Intake/Output Summary (Last 24 hours) at 4/26/2025 1328  Last data filed at 4/26/2025 1025  Gross per 24 hour   Intake 1000 ml   Output --   Net 1000 ml       General: alert, in no apparent distress  HEENT: normocephalic, atraumatic, anicteric  Lungs: non-labored respirations, clear to auscultation bilaterally  Heart: regular rate and rhythm, no murmurs  Abdomen: soft, non-tender  Ext: no peripheral edema  Neuro: alert, follows commands      Electronically signed by Jessie Melendez MD, MD                   [1] aspirin, 81 mg, oral, Daily  carvedilol, 25 mg, oral, BID  empagliflozin, 25 mg, oral, Daily  finasteride, 5 mg, oral, Daily  polyethylene glycol, 17 g, oral, Daily  tamsulosin, 0.4 mg, oral, Daily  [2]      "

## 2025-04-26 NOTE — PROGRESS NOTES
"PROGRESS NOTE    ASSESSMENT AND PLAN:     79 y.o. male with a history of essential hypertension, CKD stage IIIa, CAD s/p CABG, type 2 diabetes, BPH presenting with hyperkalemia.     #.  Hyperkalemia  #.  Acute kidney injury on CKD stage IIIa  - K 6.2 [facility], 6.4 here however mild hemolysis noted now with repeat showing 5.9 without hemolysis  - EKG reviewed and shows NSR without peaked T waves or QRS widening  - S/p IV Calcium gluconate and temporizing measures in the ER  -Renal ultrasound showed enlarged prostate  - Scr Creatinine continued to increase slowly, likely related to struct of uropathy, he did have some 700 cc, was only able to void 150 cc, started on Flomax, urology consulted     #.  Essential hypertension  #.  CAD s/p CABG  - Markedly hypertensive without signs of endorgan damage  - Continue home aspirin, Coreg  - Holding home lisinopril given hyperkalemia as above  - May need alternative agent instead of lisinopril if persistent issues with hyperkalemia     #.  Type 2 diabetes  - Continuing home Jardiance     #.  BPH  - Continue home finasteride      SUBJECTIVE:   Admit Date: 4/24/2025    Interval History: He feels okay, no active complaints.      OBJECTIVE:   Vitals: /78 (BP Location: Left arm, Patient Position: Lying)   Pulse 54   Temp 36.3 °C (97.3 °F) (Temporal)   Resp 18   Ht 1.803 m (5' 11\")   Wt 77.1 kg (170 lb)   SpO2 98%   BMI 23.71 kg/m²    Wt Readings from Last 3 Encounters:   04/24/25 77.1 kg (170 lb)   09/20/24 80.7 kg (178 lb)   10/03/21 95.3 kg (210 lb)      24HR INTAKE/OUTPUT:    Intake/Output Summary (Last 24 hours) at 4/26/2025 1129  Last data filed at 4/26/2025 1025  Gross per 24 hour   Intake 1000 ml   Output --   Net 1000 ml       PHYSICAL EXAM:   GENERAL: Laying in bed, does not appear to be in any distress.   HEENT: HEAD: Normocephalic atraumatic.  Neck: Supple.  Eyes: Pupils are reactive to direct light.   CVS: S1, S2 heard. Regular rate and rhythm  LUNGS: Clear to " "auscultate bilaterally. No wheezing or rhonchi appreciated.  ABDOMEN: Soft, nontender to palpate. Positive bowel sounds. No guarding or rebound appreciated.  NEUROLOGICAL: No focal neurological deficits appreciated. Cranial nerves are grossly intact.  EXTREMITIES: No edema appreciated.  SKIN:  Grossly intact, warm and dry.      LABS/IMAGING AND MEDICATIONS:   Scheduled Meds:Scheduled Medications[1]  PRN Meds:PRN Medications[2]    No lab exists for component: \"CBC\"   No lab exists for component: \"CMP\"   No lab exists for component: \"TROPONIN\"  Results from last 7 days   Lab Units 04/24/25 2032   BNP pg/mL 284*     Results from last 7 days   Lab Units 04/24/25 2032   INR  1.2*     No lab exists for component: \"LIPIDS\"       No lab exists for component: \"URINALYSIS\"          BMP:  Results from last 7 days   Lab Units 04/26/25  0559 04/25/25  1233 04/25/25  0646   SODIUM mmol/L 139 139 141   POTASSIUM mmol/L 4.7 5.1 4.6   CHLORIDE mmol/L 104 104 105   CO2 mmol/L 29 31 30   BUN mg/dL 31* 29* 27*   CREATININE mg/dL 1.63* 1.52* 1.44*       CBC:  Results from last 7 days   Lab Units 04/25/25  0646 04/24/25 2032   WBC AUTO x10*3/uL 7.0 6.9   RBC AUTO x10*6/uL 4.78 4.55   HEMOGLOBIN g/dL 14.1 13.6   HEMATOCRIT % 45.7 43.8   MCV fL 96 96   MCH pg 29.5 29.9   MCHC g/dL 30.9* 31.1*   RDW % 14.9* 14.9*   PLATELETS AUTO x10*3/uL 376 339       Cardiac Enzymes:   Results from last 7 days   Lab Units 04/24/25  2156 04/24/25 2032   TROPHS ng/L 5 5         Hepatic Function Panel:  Results from last 7 days   Lab Units 04/24/25 2032   ALK PHOS U/L 49   ALT U/L 20   AST U/L 21   PROTEIN TOTAL g/dL 6.9   BILIRUBIN TOTAL mg/dL 0.5       Magnesium:  Results from last 7 days   Lab Units 04/25/25  0646   MAGNESIUM mg/dL 1.56*       Pro-BNP:  No results found for: \"PROBNP\"    INR:  Results from last 7 days   Lab Units 04/24/25 2032   PROTIME seconds 12.9*   INR  1.2*       TSH:  Lab Results   Component Value Date    TSH 2.00 04/25/2025 " "      Lipid Profile:        No lab exists for component: \"LABVLDL\"    HgbA1C:        Lactate Level:  No lab exists for component: \"LACTA\"    CMP:  Results from last 7 days   Lab Units 04/26/25  0559 04/25/25  1233 04/25/25  0646 04/24/25  2156 04/24/25  2032   SODIUM mmol/L 139 139 141  --  137   POTASSIUM mmol/L 4.7 5.1 4.6   < > 6.4*   CHLORIDE mmol/L 104 104 105  --  107   CO2 mmol/L 29 31 30  --  24   BUN mg/dL 31* 29* 27*  --  28*   CREATININE mg/dL 1.63* 1.52* 1.44*  --  1.36*   GLUCOSE mg/dL 77 111* 77  --  98   CALCIUM mg/dL 9.1 9.4 9.7  --  8.8   PROTEIN TOTAL g/dL  --   --   --   --  6.9   BILIRUBIN TOTAL mg/dL  --   --   --   --  0.5   ALK PHOS U/L  --   --   --   --  49   AST U/L  --   --   --   --  21   ALT U/L  --   --   --   --  20    < > = values in this interval not displayed.                 [1] aspirin, 81 mg, oral, Daily  carvedilol, 25 mg, oral, BID  empagliflozin, 25 mg, oral, Daily  finasteride, 5 mg, oral, Daily  polyethylene glycol, 17 g, oral, Daily  tamsulosin, 0.4 mg, oral, Daily  [2] PRN medications: acetaminophen **OR** acetaminophen **OR** acetaminophen, dextrose, dextrose, glucagon, glucagon, melatonin, ondansetron **OR** ondansetron    "

## 2025-04-27 ENCOUNTER — APPOINTMENT (OUTPATIENT)
Dept: RADIOLOGY | Facility: HOSPITAL | Age: 80
DRG: 641 | End: 2025-04-27
Payer: OTHER GOVERNMENT

## 2025-04-27 ENCOUNTER — DOCUMENTATION (OUTPATIENT)
Dept: OPERATING ROOM | Facility: HOSPITAL | Age: 80
End: 2025-04-27
Payer: OTHER GOVERNMENT

## 2025-04-27 VITALS
WEIGHT: 170 LBS | BODY MASS INDEX: 23.8 KG/M2 | RESPIRATION RATE: 18 BRPM | HEART RATE: 64 BPM | DIASTOLIC BLOOD PRESSURE: 71 MMHG | TEMPERATURE: 96.8 F | OXYGEN SATURATION: 97 % | SYSTOLIC BLOOD PRESSURE: 131 MMHG | HEIGHT: 71 IN

## 2025-04-27 LAB
ALBUMIN SERPL BCP-MCNC: 4.1 G/DL (ref 3.4–5)
ANION GAP SERPL CALC-SCNC: 11 MMOL/L (ref 10–20)
BACTERIA UR CULT: NORMAL
BUN SERPL-MCNC: 33 MG/DL (ref 6–23)
CALCIUM SERPL-MCNC: 9 MG/DL (ref 8.6–10.3)
CHLORIDE SERPL-SCNC: 105 MMOL/L (ref 98–107)
CO2 SERPL-SCNC: 28 MMOL/L (ref 21–32)
CREAT SERPL-MCNC: 1.32 MG/DL (ref 0.5–1.3)
EGFRCR SERPLBLD CKD-EPI 2021: 55 ML/MIN/1.73M*2
GLUCOSE BLD MANUAL STRIP-MCNC: 114 MG/DL (ref 74–99)
GLUCOSE SERPL-MCNC: 72 MG/DL (ref 74–99)
HOLD SPECIMEN: NORMAL
PHOSPHATE SERPL-MCNC: 4.3 MG/DL (ref 2.5–4.9)
POTASSIUM SERPL-SCNC: 4.4 MMOL/L (ref 3.5–5.3)
SODIUM SERPL-SCNC: 140 MMOL/L (ref 136–145)

## 2025-04-27 PROCEDURE — 2500000002 HC RX 250 W HCPCS SELF ADMINISTERED DRUGS (ALT 637 FOR MEDICARE OP, ALT 636 FOR OP/ED): Performed by: HOSPITALIST

## 2025-04-27 PROCEDURE — 82947 ASSAY GLUCOSE BLOOD QUANT: CPT

## 2025-04-27 PROCEDURE — 2500000005 HC RX 250 GENERAL PHARMACY W/O HCPCS: Performed by: UROLOGY

## 2025-04-27 PROCEDURE — 76872 US TRANSRECTAL: CPT

## 2025-04-27 PROCEDURE — 99239 HOSP IP/OBS DSCHRG MGMT >30: CPT | Performed by: HOSPITALIST

## 2025-04-27 PROCEDURE — 36415 COLL VENOUS BLD VENIPUNCTURE: CPT | Performed by: INTERNAL MEDICINE

## 2025-04-27 PROCEDURE — 2500000001 HC RX 250 WO HCPCS SELF ADMINISTERED DRUGS (ALT 637 FOR MEDICARE OP): Performed by: STUDENT IN AN ORGANIZED HEALTH CARE EDUCATION/TRAINING PROGRAM

## 2025-04-27 PROCEDURE — 76872 US TRANSRECTAL: CPT | Performed by: RADIOLOGY

## 2025-04-27 PROCEDURE — 80069 RENAL FUNCTION PANEL: CPT | Performed by: INTERNAL MEDICINE

## 2025-04-27 RX ORDER — TAMSULOSIN HYDROCHLORIDE 0.4 MG/1
0.4 CAPSULE ORAL DAILY
Qty: 30 CAPSULE | Refills: 0 | Status: SHIPPED | OUTPATIENT
Start: 2025-04-28 | End: 2025-05-28

## 2025-04-27 RX ADMIN — CARVEDILOL 25 MG: 12.5 TABLET, FILM COATED ORAL at 08:01

## 2025-04-27 RX ADMIN — FINASTERIDE 5 MG: 5 TABLET, FILM COATED ORAL at 08:01

## 2025-04-27 RX ADMIN — EMPAGLIFLOZIN 25 MG: 25 TABLET, FILM COATED ORAL at 08:01

## 2025-04-27 RX ADMIN — TAMSULOSIN HYDROCHLORIDE 0.4 MG: 0.4 CAPSULE ORAL at 08:01

## 2025-04-27 RX ADMIN — ASPIRIN 81 MG: 81 TABLET, COATED ORAL at 08:01

## 2025-04-27 RX ADMIN — SODIUM PHOSPHATE, DIBASIC AND SODIUM PHOSPHATE, MONOBASIC 1 ENEMA: 7; 19 ENEMA RECTAL at 07:58

## 2025-04-27 ASSESSMENT — PAIN - FUNCTIONAL ASSESSMENT: PAIN_FUNCTIONAL_ASSESSMENT: 0-10

## 2025-04-27 ASSESSMENT — COGNITIVE AND FUNCTIONAL STATUS - GENERAL
MOBILITY SCORE: 24
DAILY ACTIVITIY SCORE: 24

## 2025-04-27 ASSESSMENT — PAIN SCALES - GENERAL: PAINLEVEL_OUTOF10: 0 - NO PAIN

## 2025-04-27 NOTE — CARE PLAN
The patient's goals for the shift include to get rest and take care of myself    The clinical goals for the shift include Patient will remain hemodynamically stable    Over the shift, the patient did not make progress toward the following goals. Barriers to progression include understand poc. Recommendations to address these barriers include education.

## 2025-04-27 NOTE — PROGRESS NOTES
Nephrology Progress Note    Assessment:  79 y.o. year old male who who is a patient of the [facility] alicia blood the same day of presentation got a call from his doctors to come to the emergency department to be admitted because of high potassium potassium was 6.4 patient admitted for further evaluation and management with creatinine of 1.3 and GFR of 53 that was not associated with any other electrolyte or acid-base imbalance and no anemia and the patient was asymptomatic as he is concerned initially patient was hypertensive in hypertensive urgency with a blood pressure of 222/116  His home medication does include Jardiance 25 mg looking in the patient database he was on lisinopril 10 mg daily 12/26/2024     This clinical presentation did take place in a patient with chronic comorbidity of diabetes type 2 essential hypertension benign prostatic hypertrophy patient denied any recent contrast exposure or any nonsteroidal anti-inflammatory        Plan:  - urology onboard managing retention, plan for prostate ultrasound   - no changes from renal standpoint   - will plan to have him follow up with Dr. Olson on discharge in 2 weeks        Subjective:  Admit Date: 4/24/2025    Interval History: function improving, about to go for a prostate ultrasound     Medications:  Scheduled Meds:Scheduled Medications[1]  Continuous Infusions:Continuous Medications[2]    CBC:   Lab Results   Component Value Date    WBC 7.0 04/25/2025    RBC 4.78 04/25/2025    HGB 14.1 04/25/2025    HCT 45.7 04/25/2025    MCV 96 04/25/2025    MCH 29.5 04/25/2025    MCHC 30.9 (L) 04/25/2025    RDW 14.9 (H) 04/25/2025     04/25/2025     BMP:    Lab Results   Component Value Date     04/27/2025    K 4.4 04/27/2025     04/27/2025    CO2 28 04/27/2025    BUN 33 (H) 04/27/2025    CREATININE 1.32 (H) 04/27/2025    CALCIUM 9.0 04/27/2025    GLUCOSE 72 (L) 04/27/2025       Objective:  Vitals: /71 (BP Location: Right arm,  "Patient Position: Lying)   Pulse 64   Temp 36 °C (96.8 °F) (Temporal)   Resp 18   Ht 1.803 m (5' 11\")   Wt 77.1 kg (170 lb)   SpO2 97%   BMI 23.71 kg/m²    Wt Readings from Last 3 Encounters:   04/24/25 77.1 kg (170 lb)   09/20/24 80.7 kg (178 lb)   10/03/21 95.3 kg (210 lb)      24HR INTAKE/OUTPUT:    Intake/Output Summary (Last 24 hours) at 4/27/2025 1210  Last data filed at 4/27/2025 1130  Gross per 24 hour   Intake 650 ml   Output 2700 ml   Net -2050 ml       General: alert, in no apparent distress  HEENT: normocephalic, atraumatic, anicteric  Lungs: non-labored respirations, clear to auscultation bilaterally  Heart: regular rate and rhythm, no murmurs  Abdomen: soft, non-tender  Ext: no peripheral edema  Neuro: alert, follows commands      Electronically signed by Jessie Melendez MD                   [1] aspirin, 81 mg, oral, Daily  carvedilol, 25 mg, oral, BID  empagliflozin, 25 mg, oral, Daily  finasteride, 5 mg, oral, Daily  polyethylene glycol, 17 g, oral, Daily  tamsulosin, 0.4 mg, oral, Daily     [2]      "

## 2025-04-27 NOTE — PROGRESS NOTES
Urology consult follow-up progress note  Patient to be discharged today  Reviewed urologic findings with the patient  Borderline renal insufficiency with serum creatinine of 1.32, hemoglobin 14.1  Good low PSA at 1.57 on finasteride and still within range of normal  Prostate ultrasound shows a large volume prostate of 127 cc, 8 mm nodule to the right of the midline probably not suspicious since it is not in the peripheral zone and with normal level PSA  Has been on finasteride 5 mg daily  Add tamsulosin 0.4 mg daily  Okay for discharge urologically and told the patient that [facility] or we will be happy to see him here anytime for urologic follow-up  Thank you    Additional medical and historical objective data reviewed and listed below      Current Outpatient Medications:     aspirin 81 mg EC tablet, Take 1 tablet (81 mg) by mouth once daily., Disp: , Rfl:     carvedilol (Coreg) 25 mg tablet, Take 1 tablet (25 mg) by mouth 2 times a day., Disp: , Rfl:     empagliflozin-linagliptin (Glyxambi) 25-5 mg, Take 1 tablet by mouth once daily., Disp: , Rfl:     finasteride (Proscar) 5 mg tablet, Take 1 tablet (5 mg) by mouth., Disp: , Rfl:     semaglutide (OZEMPIC) 1 mg/dose (4 mg/3 mL) pen injector, Inject under the skin., Disp: , Rfl:     [START ON 4/28/2025] tamsulosin (Flomax) 0.4 mg 24 hr capsule, Take 1 capsule (0.4 mg) by mouth once daily., Disp: 30 capsule, Rfl: 0  No current facility-administered medications for this visit.    Facility-Administered Medications Ordered in Other Visits:     acetaminophen (Tylenol) tablet 650 mg, 650 mg, oral, q4h PRN **OR** acetaminophen (Tylenol) oral liquid 650 mg, 650 mg, nasogastric tube, q4h PRN **OR** acetaminophen (Tylenol) suppository 650 mg, 650 mg, rectal, q4h PRN, Khoi De Luna MD    aspirin EC tablet 81 mg, 81 mg, oral, Daily, Khoi De Luna MD, 81 mg at 04/27/25 0801    carvedilol (Coreg)  tablet 25 mg, 25 mg, oral, BID, Khoi De Luna MD, 25 mg at 04/27/25 0801    dextrose 50 % injection 12.5 g, 12.5 g, intravenous, q15 min PRN, Khoi De Luna MD    dextrose 50 % injection 25 g, 25 g, intravenous, q15 min PRN, Kohi De Luna MD    empagliflozin (Jardiance) tablet 25 mg, 25 mg, oral, Daily, Khoi De Luna MD, 25 mg at 04/27/25 0801    finasteride (Proscar) tablet 5 mg, 5 mg, oral, Daily, Khoi De Luna MD, 5 mg at 04/27/25 0801    glucagon (Glucagen) injection 1 mg, 1 mg, intramuscular, q15 min PRN, Khoi De Luna MD    glucagon (Glucagen) injection 1 mg, 1 mg, intramuscular, q15 min PRN, Khoi De Luna MD    melatonin tablet 3 mg, 3 mg, oral, Nightly PRN, Khoi De Luna MD    ondansetron (Zofran) tablet 4 mg, 4 mg, oral, q8h PRN **OR** ondansetron (Zofran) injection 4 mg, 4 mg, intravenous, q8h PRN, Khoi De Luna MD    polyethylene glycol (Glycolax, Miralax) packet 17 g, 17 g, oral, Daily, Khoi De Luna MD    tamsulosin (Flomax) 24 hr capsule 0.4 mg, 0.4 mg, oral, Daily, Aaliyah Hylton MD, 0.4 mg at 04/27/25 0801      Results for orders placed or performed during the hospital encounter of 04/24/25 (from the past 96 hours)   ECG 12 lead   Result Value Ref Range    Ventricular Rate 61 BPM    Atrial Rate 61 BPM    RI Interval 164 ms    QRS Duration 64 ms    QT Interval 412 ms    QTC Calculation(Bazett) 414 ms    R Axis -4 degrees    T Axis 27 degrees    QRS Count 10 beats    Q Onset 230 ms    P Onset 148 ms    P Offset 196 ms    T Offset 436 ms    QTC Fredericia 414 ms   CBC and Auto Differential   Result Value Ref Range    WBC 6.9 4.4 - 11.3 x10*3/uL    nRBC 0.0 0.0 - 0.0 /100 WBCs    RBC 4.55 4.50 - 5.90 x10*6/uL    Hemoglobin 13.6 13.5 - 17.5 g/dL    Hematocrit 43.8 41.0 - 52.0 %    MCV 96 80 - 100 fL    MCH 29.9 26.0 - 34.0 pg    MCHC 31.1 (L) 32.0 - 36.0 g/dL    RDW 14.9 (H) 11.5 - 14.5 %    Platelets 339 150 - 450 x10*3/uL    Neutrophils %  67.0 40.0 - 80.0 %    Immature Granulocytes %, Automated 0.4 0.0 - 0.9 %    Lymphocytes % 19.8 13.0 - 44.0 %    Monocytes % 9.8 2.0 - 10.0 %    Eosinophils % 1.7 0.0 - 6.0 %    Basophils % 1.3 0.0 - 2.0 %    Neutrophils Absolute 4.63 1.60 - 5.50 x10*3/uL    Immature Granulocytes Absolute, Automated 0.03 0.00 - 0.50 x10*3/uL    Lymphocytes Absolute 1.37 0.80 - 3.00 x10*3/uL    Monocytes Absolute 0.68 0.05 - 0.80 x10*3/uL    Eosinophils Absolute 0.12 0.00 - 0.40 x10*3/uL    Basophils Absolute 0.09 0.00 - 0.10 x10*3/uL   Comprehensive Metabolic Panel   Result Value Ref Range    Glucose 98 74 - 99 mg/dL    Sodium 137 136 - 145 mmol/L    Potassium 6.4 (HH) 3.5 - 5.3 mmol/L    Chloride 107 98 - 107 mmol/L    Bicarbonate 24 21 - 32 mmol/L    Anion Gap 12 10 - 20 mmol/L    Urea Nitrogen 28 (H) 6 - 23 mg/dL    Creatinine 1.36 (H) 0.50 - 1.30 mg/dL    eGFR 53 (L) >60 mL/min/1.73m*2    Calcium 8.8 8.6 - 10.3 mg/dL    Albumin 4.3 3.4 - 5.0 g/dL    Alkaline Phosphatase 49 33 - 136 U/L    Total Protein 6.9 6.4 - 8.2 g/dL    AST 21 9 - 39 U/L    Bilirubin, Total 0.5 0.0 - 1.2 mg/dL    ALT 20 10 - 52 U/L   Magnesium   Result Value Ref Range    Magnesium 1.67 1.60 - 2.40 mg/dL   Lactate   Result Value Ref Range    Lactate 0.5 0.4 - 2.0 mmol/L   Protime-INR   Result Value Ref Range    Protime 12.9 (H) 9.8 - 12.4 seconds    INR 1.2 (H) 0.9 - 1.1   B-Type Natriuretic Peptide   Result Value Ref Range     (H) 0 - 99 pg/mL   Troponin I, High Sensitivity, Initial   Result Value Ref Range    Troponin I, High Sensitivity 5 0 - 20 ng/L   Troponin, High Sensitivity, 1 Hour   Result Value Ref Range    Troponin I, High Sensitivity 5 0 - 20 ng/L   Potassium   Result Value Ref Range    Potassium 5.9 (H) 3.5 - 5.3 mmol/L   ECG 12 lead   Result Value Ref Range    Ventricular Rate 71 BPM    Atrial Rate 71 BPM    NC Interval 174 ms    QRS Duration 74 ms    QT Interval 378 ms    QTC Calculation(Bazett) 410 ms    P Axis -26 degrees    R Axis -14  degrees    T Axis 11 degrees    QRS Count 12 beats    Q Onset 228 ms    P Onset 141 ms    P Offset 196 ms    T Offset 417 ms    QTC Fredericia 400 ms   POCT GLUCOSE   Result Value Ref Range    POCT Glucose 149 (H) 74 - 99 mg/dL   POCT GLUCOSE   Result Value Ref Range    POCT Glucose 77 74 - 99 mg/dL   SST TOP   Result Value Ref Range    Extra Tube Hold for add-ons.    CBC   Result Value Ref Range    WBC 7.0 4.4 - 11.3 x10*3/uL    nRBC 0.0 0.0 - 0.0 /100 WBCs    RBC 4.78 4.50 - 5.90 x10*6/uL    Hemoglobin 14.1 13.5 - 17.5 g/dL    Hematocrit 45.7 41.0 - 52.0 %    MCV 96 80 - 100 fL    MCH 29.5 26.0 - 34.0 pg    MCHC 30.9 (L) 32.0 - 36.0 g/dL    RDW 14.9 (H) 11.5 - 14.5 %    Platelets 376 150 - 450 x10*3/uL   Basic metabolic panel   Result Value Ref Range    Glucose 77 74 - 99 mg/dL    Sodium 141 136 - 145 mmol/L    Potassium 4.6 3.5 - 5.3 mmol/L    Chloride 105 98 - 107 mmol/L    Bicarbonate 30 21 - 32 mmol/L    Anion Gap 11 10 - 20 mmol/L    Urea Nitrogen 27 (H) 6 - 23 mg/dL    Creatinine 1.44 (H) 0.50 - 1.30 mg/dL    eGFR 50 (L) >60 mL/min/1.73m*2    Calcium 9.7 8.6 - 10.3 mg/dL   Magnesium   Result Value Ref Range    Magnesium 1.56 (L) 1.60 - 2.40 mg/dL   POCT GLUCOSE   Result Value Ref Range    POCT Glucose 95 74 - 99 mg/dL   ECG 12 lead   Result Value Ref Range    Ventricular Rate 74 BPM    Atrial Rate 74 BPM    HI Interval 166 ms    QRS Duration 84 ms    QT Interval 384 ms    QTC Calculation(Bazett) 426 ms    P Axis 8 degrees    R Axis -6 degrees    T Axis 8 degrees    QRS Count 12 beats    Q Onset 213 ms    P Onset 130 ms    P Offset 195 ms    T Offset 405 ms    QTC Fredericia 412 ms   Basic metabolic panel   Result Value Ref Range    Glucose 111 (H) 74 - 99 mg/dL    Sodium 139 136 - 145 mmol/L    Potassium 5.1 3.5 - 5.3 mmol/L    Chloride 104 98 - 107 mmol/L    Bicarbonate 31 21 - 32 mmol/L    Anion Gap 9 (L) 10 - 20 mmol/L    Urea Nitrogen 29 (H) 6 - 23 mg/dL    Creatinine 1.52 (H) 0.50 - 1.30 mg/dL    eGFR  47 (L) >60 mL/min/1.73m*2    Calcium 9.4 8.6 - 10.3 mg/dL   TSH with reflex to Free T4 if abnormal   Result Value Ref Range    Thyroid Stimulating Hormone 2.00 0.44 - 3.98 mIU/L   PTH, Intact   Result Value Ref Range    Parathyroid Hormone, Intact 84.5 18.5 - 88.0 pg/mL   Vitamin D 25-Hydroxy,Total (for eval of Vitamin D levels)   Result Value Ref Range    Vitamin D, 25-Hydroxy, Total 8 (L) 30 - 100 ng/mL   Prostate Specific Antigen, Screen   Result Value Ref Range    Prostate Specific Antigen,Screen 1.57 <=4.00 ng/mL   Urinalysis with Reflex Microscopic   Result Value Ref Range    Color, Urine Light-Yellow Light-Yellow, Yellow, Dark-Yellow    Appearance, Urine Clear Clear    Specific Gravity, Urine 1.008 1.005 - 1.035    pH, Urine 5.5 5.0, 5.5, 6.0, 6.5, 7.0, 7.5, 8.0    Protein, Urine 10 (TRACE) NEGATIVE, 10 (TRACE), 20 (TRACE) mg/dL    Glucose, Urine 1000 (4+) (A) Normal mg/dL    Blood, Urine NEGATIVE NEGATIVE mg/dL    Ketones, Urine NEGATIVE NEGATIVE mg/dL    Bilirubin, Urine NEGATIVE NEGATIVE mg/dL    Urobilinogen, Urine Normal Normal mg/dL    Nitrite, Urine NEGATIVE NEGATIVE    Leukocyte Esterase, Urine NEGATIVE NEGATIVE   Albumin-Creatinine Ratio, Urine Random   Result Value Ref Range    Albumin, Urine Random 164.3 Not established mg/L    Creatinine, Urine Random 41.6 20.0 - 370.0 mg/dL    Albumin/Creatinine Ratio 395.0 (H) <30.0 ug/mg Creat   Microscopic Only, Urine   Result Value Ref Range    WBC, Urine 1-5 1-5, NONE /HPF    RBC, Urine NONE NONE, 1-2, 3-5 /HPF    Bacteria, Urine 1+ (A) NONE SEEN /HPF   POCT GLUCOSE   Result Value Ref Range    POCT Glucose 101 (H) 74 - 99 mg/dL   POCT GLUCOSE   Result Value Ref Range    POCT Glucose 131 (H) 74 - 99 mg/dL   Renal Function Panel   Result Value Ref Range    Glucose 77 74 - 99 mg/dL    Sodium 139 136 - 145 mmol/L    Potassium 4.7 3.5 - 5.3 mmol/L    Chloride 104 98 - 107 mmol/L    Bicarbonate 29 21 - 32 mmol/L    Anion Gap 11 10 - 20 mmol/L    Urea Nitrogen 31  (H) 6 - 23 mg/dL    Creatinine 1.63 (H) 0.50 - 1.30 mg/dL    eGFR 43 (L) >60 mL/min/1.73m*2    Calcium 9.1 8.6 - 10.3 mg/dL    Phosphorus 4.3 2.5 - 4.9 mg/dL    Albumin 4.0 3.4 - 5.0 g/dL   Lavender Top   Result Value Ref Range    Extra Tube Hold for add-ons.    Magnesium   Result Value Ref Range    Magnesium 1.99 1.60 - 2.40 mg/dL   POCT GLUCOSE   Result Value Ref Range    POCT Glucose 119 (H) 74 - 99 mg/dL   Urine Gray Tube   Result Value Ref Range    Extra Tube Hold for add-ons.    Urinalysis with Reflex Culture and Microscopic   Result Value Ref Range    Color, Urine Light-Yellow Light-Yellow, Yellow, Dark-Yellow    Appearance, Urine Clear Clear    Specific Gravity, Urine 1.010 1.005 - 1.035    pH, Urine 6.0 5.0, 5.5, 6.0, 6.5, 7.0, 7.5, 8.0    Protein, Urine 20 (TRACE) NEGATIVE, 10 (TRACE), 20 (TRACE) mg/dL    Glucose, Urine OVER (4+) (A) Normal mg/dL    Blood, Urine NEGATIVE NEGATIVE mg/dL    Ketones, Urine NEGATIVE NEGATIVE mg/dL    Bilirubin, Urine NEGATIVE NEGATIVE mg/dL    Urobilinogen, Urine Normal Normal mg/dL    Nitrite, Urine NEGATIVE NEGATIVE    Leukocyte Esterase, Urine 25 Ruperto/uL (A) NEGATIVE   Microscopic Only, Urine   Result Value Ref Range    WBC, Urine 1-5 1-5, NONE /HPF    RBC, Urine 1-2 NONE, 1-2, 3-5 /HPF    Mucus, Urine FEW Reference range not established. /LPF   Urine Culture    Specimen: Clean Catch/Voided; Urine   Result Value Ref Range    Urine Culture       Clinically insignificant growth based on current clinical standards.   Basic metabolic panel   Result Value Ref Range    Glucose 118 (H) 74 - 99 mg/dL    Sodium 138 136 - 145 mmol/L    Potassium 5.0 3.5 - 5.3 mmol/L    Chloride 104 98 - 107 mmol/L    Bicarbonate 29 21 - 32 mmol/L    Anion Gap 10 10 - 20 mmol/L    Urea Nitrogen 32 (H) 6 - 23 mg/dL    Creatinine 1.54 (H) 0.50 - 1.30 mg/dL    eGFR 46 (L) >60 mL/min/1.73m*2    Calcium 8.9 8.6 - 10.3 mg/dL   Renal Function Panel   Result Value Ref Range    Glucose 72 (L) 74 - 99 mg/dL     Sodium 140 136 - 145 mmol/L    Potassium 4.4 3.5 - 5.3 mmol/L    Chloride 105 98 - 107 mmol/L    Bicarbonate 28 21 - 32 mmol/L    Anion Gap 11 10 - 20 mmol/L    Urea Nitrogen 33 (H) 6 - 23 mg/dL    Creatinine 1.32 (H) 0.50 - 1.30 mg/dL    eGFR 55 (L) >60 mL/min/1.73m*2    Calcium 9.0 8.6 - 10.3 mg/dL    Phosphorus 4.3 2.5 - 4.9 mg/dL    Albumin 4.1 3.4 - 5.0 g/dL   Lavender Top   Result Value Ref Range    Extra Tube Hold for add-ons.    POCT GLUCOSE   Result Value Ref Range    POCT Glucose 114 (H) 74 - 99 mg/dL     US prostate transrectal  Result Date: 4/27/2025  Interpreted By:  Jerrod Chacon, STUDY: US PROSTATE TRANSRECTAL;  4/27/2025 11:00 am   INDICATION: Signs/Symptoms:BPH, history of elevated PSA and prostate biopsies in the past.     COMPARISON: CT abdomen/pelvis of 10/07/2021.   ACCESSION NUMBER(S): [id number]   ORDERING CLINICIAN: LOUIS D'AMICO   TECHNIQUE: Real-time transrectal sonographic evaluation of the prostate was performed.   FINDINGS: There is grade  3+ enlargement of the prostate. Prostate measures 7.8 x 5.6 x 5.5 cm for a calculated volume of  127 cubic cm.   There is nonspecific heterogeneity of the prostate. Punctate hyperechoic foci may represent small dystrophic calcifications. Right of midline there is a small indeterminate hyperechoic nodule measuring 8 x 8 x 8 mm.         Grade  3+ enlargement of the prostate.   Heterogeneous prostate with punctate dystrophic calcifications.   Indeterminate 8 mm hyperechoic nodule right of midline.       MACRO: None.   Signed by: Jerrod Chacon 4/27/2025 11:56 AM Dictation workstation:   [id number]    US renal complete  Result Date: 4/25/2025  Interpreted By:  Meme Piedra, STUDY: US RENAL COMPLETE;  4/25/2025 7:25 pm   INDICATION: Signs/Symptoms:Acute kidney injury hyperkalemia patient of the [facility] system presented with hyperkalemia and acute kidney injury with history of diabetes and hypertension and diabetic urgency.     COMPARISON:  CT abdomen and pelvis 10/07/2021.   ACCESSION NUMBER(S): [id number]   ORDERING CLINICIAN: BROOKE KLEIN   TECHNIQUE: Multiple images of the kidneys were obtained  .   FINDINGS: RIGHT KIDNEY: The right kidney measures 11.7cm  in length. The renal cortical echogenicity and thickness are within normal limits. No hydronephrosis is present.   LEFT KIDNEY: The left kidney measures 11.7cm  in length. The renal cortical echogenicity and thickness are within normal limits. No hydronephrosis is present.   BLADDER: The urinary bladder is partially distended with circumferential wall thickening measuring 0.5 cm.   The prostate has a nodular contour indenting the base of the urinary bladder. The prostate measures 6.7 x 5.8 x 6.1 cm.       1. No hydronephrosis. 2. Circumferential wall thickening of the urinary bladder may be secondary to underdistention or chronic outlet obstruction. Please correlate with urinalysis to exclude superimposed cystitis. 3. Prostatomegaly. Nodular contour of the prostate indents the base of the urinary bladder.       MACRO: None     Signed by: Meme Piedra 4/25/2025 8:53 PM Dictation workstation:   [id number]    ECG 12 lead  Result Date: 4/25/2025  Sinus rhythm with occasional Premature ventricular complexes and Premature atrial complexes Inferior infarct (cited on or before 24-APR-2025) Abnormal ECG When compared with ECG of 24-APR-2025 22:46, (unconfirmed) Premature ventricular complexes are now Present Premature atrial complexes are now Present Nonspecific T wave abnormality now evident in Lateral leads Confirmed by Mian Stringer ([id number]) on 4/25/2025 3:11:19 PM    ECG 12 lead  Result Date: 4/25/2025  Normal sinus rhythm Inferior infarct (cited on or before 24-APR-2025) Abnormal ECG When compared with ECG of 24-APR-2025 20:22, (unconfirmed) No significant change was found See ED provider note for full interpretation and clinical correlation Confirmed by Cami Pennington ([id number])  on 4/25/2025 2:53:52 PM    ECG 12 lead  Result Date: 4/25/2025  Normal sinus rhythm with sinus arrhythmia Inferior infarct (cited on or before 24-APR-2025) Possible Anterior infarct , age undetermined Abnormal ECG When compared with ECG of 22-NOV-2022 11:57, Previous ECG has undetermined rhythm, needs review See ED provider note for full interpretation and clinical correlation Confirmed by Cami Pennington ([id number]) on 4/25/2025 2:45:27 PM    XR chest 1 view  Result Date: 4/24/2025  Interpreted By:  Annel Badillo, STUDY: Chest, single AP view.   INDICATION: Signs/Symptoms:Chest Pain.   COMPARISON: 10/07/2021.   ACCESSION NUMBER(S): [id number]   ORDERING CLINICIAN: RIP YEN   FINDINGS: Median sternotomy. The cardiac silhouette size is within normal limits. Atelectatic changes of the lung bases.   There is no focal consolidation, edema or pneumothorax. No sizeable pleural effusion. No acute osseous abnormality.       1. No acute cardiopulmonary process.   MACRO: None.   Signed by: Annel Badillo 4/24/2025 9:22 PM Dictation workstation:   [id number]

## 2025-04-27 NOTE — CARE PLAN
The patient's goals for the shift include to get rest and take care of myself    The clinical goals for the shift include hemodynamically stable    Over the shift, the patient did make progress toward the following goals.

## 2025-07-16 ENCOUNTER — HOSPITAL ENCOUNTER (EMERGENCY)
Facility: HOSPITAL | Age: 80
Discharge: HOME | End: 2025-07-16
Attending: EMERGENCY MEDICINE
Payer: MEDICARE

## 2025-07-16 VITALS
OXYGEN SATURATION: 97 % | DIASTOLIC BLOOD PRESSURE: 79 MMHG | RESPIRATION RATE: 16 BRPM | WEIGHT: 170 LBS | TEMPERATURE: 98.4 F | SYSTOLIC BLOOD PRESSURE: 129 MMHG | HEART RATE: 85 BPM | BODY MASS INDEX: 23.8 KG/M2 | HEIGHT: 71 IN

## 2025-07-16 DIAGNOSIS — Z71.1 PERSON WITH FEARED COMPLAINT IN WHOM NO DIAGNOSIS WAS MADE: Primary | ICD-10-CM

## 2025-07-16 LAB
ANION GAP SERPL CALC-SCNC: 13 MMOL/L (ref 10–20)
BUN SERPL-MCNC: 41 MG/DL (ref 6–23)
CALCIUM SERPL-MCNC: 8.2 MG/DL (ref 8.6–10.3)
CHLORIDE SERPL-SCNC: 105 MMOL/L (ref 98–107)
CO2 SERPL-SCNC: 23 MMOL/L (ref 21–32)
CREAT SERPL-MCNC: 1.76 MG/DL (ref 0.5–1.3)
EGFRCR SERPLBLD CKD-EPI 2021: 39 ML/MIN/1.73M*2
GLUCOSE SERPL-MCNC: 128 MG/DL (ref 74–99)
POTASSIUM SERPL-SCNC: 4.8 MMOL/L (ref 3.5–5.3)
SODIUM SERPL-SCNC: 136 MMOL/L (ref 136–145)

## 2025-07-16 PROCEDURE — 36415 COLL VENOUS BLD VENIPUNCTURE: CPT | Performed by: EMERGENCY MEDICINE

## 2025-07-16 PROCEDURE — 99283 EMERGENCY DEPT VISIT LOW MDM: CPT | Performed by: EMERGENCY MEDICINE

## 2025-07-16 PROCEDURE — 80048 BASIC METABOLIC PNL TOTAL CA: CPT | Performed by: EMERGENCY MEDICINE

## 2025-07-16 ASSESSMENT — PAIN SCALES - GENERAL: PAINLEVEL_OUTOF10: 0 - NO PAIN

## 2025-07-16 ASSESSMENT — PAIN - FUNCTIONAL ASSESSMENT: PAIN_FUNCTIONAL_ASSESSMENT: 0-10

## 2025-07-17 NOTE — ED PROVIDER NOTES
HPI   Chief Complaint   Patient presents with    abnormal labs     [facility], the doctor called and said to come in, my potassium was a little high       Patient is a pleasant 79-year-old male with a history of chronic kidney disease hypertension had outpatient labs done and his potassium was 6.2 so his doctor told him to come and be seen.  No palpitations no nausea no vomiting no headaches.              Patient History   Medical History[1]  Surgical History[2]  Family History[3]  Social History[4]    Physical Exam   ED Triage Vitals [07/16/25 2150]   Temperature Heart Rate Respirations BP   36.9 °C (98.4 °F) 85 16 129/79      Pulse Ox Temp Source Heart Rate Source Patient Position   97 % Temporal Monitor Sitting      BP Location FiO2 (%)     Right arm --       Physical Exam  Vitals reviewed.   Constitutional:       Appearance: Normal appearance.     Cardiovascular:      Rate and Rhythm: Normal rate.   Pulmonary:      Effort: Pulmonary effort is normal.      Breath sounds: Normal breath sounds.     Musculoskeletal:         General: Normal range of motion.     Neurological:      Mental Status: He is alert.           ED Course & MDM   Diagnoses as of 07/16/25 2241   Person with feared complaint in whom no diagnosis was made                 No data recorded     [place] Coma Scale Score: 15 (07/16/25 2228 : Doc Obrien RN)                           Medical Decision Making  My differential diagnosis  Acute electrolyte imbalance acute lab error acute medication reaction  Today basic metabolic panel following which further disposition will be done  Patient's potassium was 4.8  At this time patient was reassured and discharged home  Labs Reviewed  BASIC METABOLIC PANEL - Abnormal                 Procedure  Procedures       [1]   Past Medical History:  Diagnosis Date    Atherosclerotic heart disease of native coronary artery without angina pectoris     CAD, multiple vessel    Personal history  of other diseases of the circulatory system     H/O: HTN (hypertension)    Personal history of other diseases of the circulatory system     History of atrial fibrillation    Personal history of other endocrine, nutritional and metabolic disease     History of diabetes mellitus    Personal history of other endocrine, nutritional and metabolic disease     History of high cholesterol   [2]   Past Surgical History:  Procedure Laterality Date    OTHER SURGICAL HISTORY  10/03/2021    Coronary artery bypass graft   [3] No family history on file.  [4]   Social History  Tobacco Use    Smoking status: Never    Smokeless tobacco: Never   Vaping Use    Vaping status: Never Used   Substance Use Topics    Alcohol use: Not on file    Drug use: Not on file        Jim Silva MD  07/16/25 [id number]

## 2025-09-03 ENCOUNTER — HOSPITAL ENCOUNTER (EMERGENCY)
Facility: HOSPITAL | Age: 80
Discharge: HOME | End: 2025-09-03
Payer: MEDICARE

## 2025-09-03 ENCOUNTER — APPOINTMENT (OUTPATIENT)
Dept: CARDIOLOGY | Facility: HOSPITAL | Age: 80
End: 2025-09-03
Payer: MEDICARE

## 2025-09-03 ENCOUNTER — APPOINTMENT (OUTPATIENT)
Dept: RADIOLOGY | Facility: HOSPITAL | Age: 80
End: 2025-09-03
Payer: MEDICARE

## 2025-09-03 VITALS
HEART RATE: 88 BPM | OXYGEN SATURATION: 98 % | BODY MASS INDEX: 23.8 KG/M2 | HEIGHT: 71 IN | WEIGHT: 170 LBS | DIASTOLIC BLOOD PRESSURE: 71 MMHG | SYSTOLIC BLOOD PRESSURE: 113 MMHG | RESPIRATION RATE: 16 BRPM | TEMPERATURE: 96.1 F

## 2025-09-03 DIAGNOSIS — S16.1XXA STRAIN OF NECK MUSCLE, INITIAL ENCOUNTER: ICD-10-CM

## 2025-09-03 DIAGNOSIS — S09.90XA CLOSED HEAD INJURY, INITIAL ENCOUNTER: ICD-10-CM

## 2025-09-03 DIAGNOSIS — N39.0 URINARY TRACT INFECTION WITH HEMATURIA, SITE UNSPECIFIED: Primary | ICD-10-CM

## 2025-09-03 DIAGNOSIS — R31.9 URINARY TRACT INFECTION WITH HEMATURIA, SITE UNSPECIFIED: Primary | ICD-10-CM

## 2025-09-03 LAB
ALBUMIN SERPL BCP-MCNC: 4.6 G/DL (ref 3.4–5)
ALP SERPL-CCNC: 70 U/L (ref 33–136)
ALT SERPL W P-5'-P-CCNC: 12 U/L (ref 10–52)
ANION GAP SERPL CALC-SCNC: 16 MMOL/L (ref 10–20)
APPEARANCE UR: ABNORMAL
AST SERPL W P-5'-P-CCNC: 15 U/L (ref 9–39)
BACTERIA #/AREA URNS AUTO: ABNORMAL /HPF
BASOPHILS # BLD AUTO: 0.09 X10*3/UL (ref 0–0.1)
BASOPHILS NFR BLD AUTO: 0.8 %
BILIRUB SERPL-MCNC: 0.8 MG/DL (ref 0–1.2)
BILIRUB UR STRIP.AUTO-MCNC: NEGATIVE MG/DL
BUN SERPL-MCNC: 52 MG/DL (ref 6–23)
CALCIUM SERPL-MCNC: 9.4 MG/DL (ref 8.6–10.3)
CARDIAC TROPONIN I PNL SERPL HS: 7 NG/L (ref 0–20)
CARDIAC TROPONIN I PNL SERPL HS: 7 NG/L (ref 0–20)
CHLORIDE SERPL-SCNC: 104 MMOL/L (ref 98–107)
CO2 SERPL-SCNC: 23 MMOL/L (ref 21–32)
COLOR UR: ABNORMAL
CREAT SERPL-MCNC: 2.29 MG/DL (ref 0.5–1.3)
EGFRCR SERPLBLD CKD-EPI 2021: 29 ML/MIN/1.73M*2
EOSINOPHIL # BLD AUTO: 0.06 X10*3/UL (ref 0–0.4)
EOSINOPHIL NFR BLD AUTO: 0.5 %
ERYTHROCYTE [DISTWIDTH] IN BLOOD BY AUTOMATED COUNT: 15.4 % (ref 11.5–14.5)
GLUCOSE SERPL-MCNC: 127 MG/DL (ref 74–99)
GLUCOSE UR STRIP.AUTO-MCNC: ABNORMAL MG/DL
HCT VFR BLD AUTO: 43.8 % (ref 41–52)
HGB BLD-MCNC: 14.1 G/DL (ref 13.5–17.5)
IMM GRANULOCYTES # BLD AUTO: 0.03 X10*3/UL (ref 0–0.5)
IMM GRANULOCYTES NFR BLD AUTO: 0.3 % (ref 0–0.9)
KETONES UR STRIP.AUTO-MCNC: NEGATIVE MG/DL
LACTATE SERPL-SCNC: 1.3 MMOL/L (ref 0.4–2)
LEUKOCYTE ESTERASE UR QL STRIP.AUTO: ABNORMAL
LIPASE SERPL-CCNC: 24 U/L (ref 9–82)
LYMPHOCYTES # BLD AUTO: 0.8 X10*3/UL (ref 0.8–3)
LYMPHOCYTES NFR BLD AUTO: 6.8 %
MCH RBC QN AUTO: 28.9 PG (ref 26–34)
MCHC RBC AUTO-ENTMCNC: 32.2 G/DL (ref 32–36)
MCV RBC AUTO: 90 FL (ref 80–100)
MONOCYTES # BLD AUTO: 1.28 X10*3/UL (ref 0.05–0.8)
MONOCYTES NFR BLD AUTO: 10.9 %
MUCOUS THREADS #/AREA URNS AUTO: ABNORMAL /LPF
NEUTROPHILS # BLD AUTO: 9.45 X10*3/UL (ref 1.6–5.5)
NEUTROPHILS NFR BLD AUTO: 80.7 %
NITRITE UR QL STRIP.AUTO: NEGATIVE
NRBC BLD-RTO: 0 /100 WBCS (ref 0–0)
PH UR STRIP.AUTO: 5.5 [PH]
PLATELET # BLD AUTO: 510 X10*3/UL (ref 150–450)
POTASSIUM SERPL-SCNC: 4.6 MMOL/L (ref 3.5–5.3)
PROT SERPL-MCNC: 7.8 G/DL (ref 6.4–8.2)
PROT UR STRIP.AUTO-MCNC: ABNORMAL MG/DL
RBC # BLD AUTO: 4.88 X10*6/UL (ref 4.5–5.9)
RBC # UR STRIP.AUTO: ABNORMAL MG/DL
RBC #/AREA URNS AUTO: ABNORMAL /HPF
SODIUM SERPL-SCNC: 138 MMOL/L (ref 136–145)
SP GR UR STRIP.AUTO: 1.01
SQUAMOUS #/AREA URNS AUTO: ABNORMAL /HPF
UROBILINOGEN UR STRIP.AUTO-MCNC: NORMAL MG/DL
WBC # BLD AUTO: 11.7 X10*3/UL (ref 4.4–11.3)
WBC #/AREA URNS AUTO: >50 /HPF
WBC CLUMPS #/AREA URNS AUTO: ABNORMAL /HPF

## 2025-09-03 PROCEDURE — 84075 ASSAY ALKALINE PHOSPHATASE: CPT | Performed by: PHYSICIAN ASSISTANT

## 2025-09-03 PROCEDURE — 2500000004 HC RX 250 GENERAL PHARMACY W/ HCPCS (ALT 636 FOR OP/ED): Performed by: PHYSICIAN ASSISTANT

## 2025-09-03 PROCEDURE — 96361 HYDRATE IV INFUSION ADD-ON: CPT

## 2025-09-03 PROCEDURE — 84484 ASSAY OF TROPONIN QUANT: CPT | Performed by: PHYSICIAN ASSISTANT

## 2025-09-03 PROCEDURE — 81001 URINALYSIS AUTO W/SCOPE: CPT | Performed by: PHYSICIAN ASSISTANT

## 2025-09-03 PROCEDURE — 93005 ELECTROCARDIOGRAM TRACING: CPT

## 2025-09-03 PROCEDURE — 70450 CT HEAD/BRAIN W/O DYE: CPT | Performed by: RADIOLOGY

## 2025-09-03 PROCEDURE — 85025 COMPLETE CBC W/AUTO DIFF WBC: CPT | Performed by: PHYSICIAN ASSISTANT

## 2025-09-03 PROCEDURE — 83605 ASSAY OF LACTIC ACID: CPT | Performed by: PHYSICIAN ASSISTANT

## 2025-09-03 PROCEDURE — 70450 CT HEAD/BRAIN W/O DYE: CPT

## 2025-09-03 PROCEDURE — 96365 THER/PROPH/DIAG IV INF INIT: CPT

## 2025-09-03 PROCEDURE — 72125 CT NECK SPINE W/O DYE: CPT

## 2025-09-03 PROCEDURE — 87077 CULTURE AEROBIC IDENTIFY: CPT | Mod: ELYLAB | Performed by: PHYSICIAN ASSISTANT

## 2025-09-03 PROCEDURE — 72125 CT NECK SPINE W/O DYE: CPT | Performed by: RADIOLOGY

## 2025-09-03 PROCEDURE — 36415 COLL VENOUS BLD VENIPUNCTURE: CPT | Performed by: PHYSICIAN ASSISTANT

## 2025-09-03 PROCEDURE — 71045 X-RAY EXAM CHEST 1 VIEW: CPT

## 2025-09-03 PROCEDURE — 71045 X-RAY EXAM CHEST 1 VIEW: CPT | Performed by: RADIOLOGY

## 2025-09-03 PROCEDURE — 87075 CULTR BACTERIA EXCEPT BLOOD: CPT | Mod: ELYLAB | Performed by: PHYSICIAN ASSISTANT

## 2025-09-03 PROCEDURE — 83690 ASSAY OF LIPASE: CPT | Performed by: PHYSICIAN ASSISTANT

## 2025-09-03 PROCEDURE — 99285 EMERGENCY DEPT VISIT HI MDM: CPT | Mod: 25

## 2025-09-03 RX ORDER — CEFTRIAXONE 1 G/50ML
1 INJECTION, SOLUTION INTRAVENOUS ONCE
Status: COMPLETED | OUTPATIENT
Start: 2025-09-03 | End: 2025-09-03

## 2025-09-03 RX ORDER — CEPHALEXIN 500 MG/1
500 CAPSULE ORAL EVERY 6 HOURS
Qty: 40 CAPSULE | Refills: 0 | Status: SHIPPED | OUTPATIENT
Start: 2025-09-03 | End: 2025-09-13

## 2025-09-03 RX ADMIN — SODIUM CHLORIDE 1000 ML: 0.9 INJECTION, SOLUTION INTRAVENOUS at 19:47

## 2025-09-03 RX ADMIN — CEFTRIAXONE 1 G: 1 INJECTION, SOLUTION INTRAVENOUS at 21:20

## 2025-09-03 RX ADMIN — SODIUM CHLORIDE 500 ML: 0.9 INJECTION, SOLUTION INTRAVENOUS at 21:20

## 2025-09-03 ASSESSMENT — PAIN SCALES - GENERAL
PAINLEVEL_OUTOF10: 0 - NO PAIN
PAINLEVEL_OUTOF10: 0 - NO PAIN

## 2025-09-03 ASSESSMENT — PAIN - FUNCTIONAL ASSESSMENT
PAIN_FUNCTIONAL_ASSESSMENT: 0-10
PAIN_FUNCTIONAL_ASSESSMENT: 0-10

## 2025-09-04 LAB
BACTERIA UR CULT: ABNORMAL
HOLD SPECIMEN: NORMAL

## 2025-09-05 ENCOUNTER — RESULTS FOLLOW-UP (OUTPATIENT)
Dept: PHARMACY | Facility: HOSPITAL | Age: 80
End: 2025-09-05
Payer: MEDICARE

## 2025-09-05 LAB
ATRIAL RATE: 101 BPM
P AXIS: 2 DEGREES
P OFFSET: 200 MS
P ONSET: 137 MS
PR INTERVAL: 156 MS
Q ONSET: 215 MS
QRS COUNT: 16 BEATS
QRS DURATION: 84 MS
QT INTERVAL: 344 MS
QTC CALCULATION(BAZETT): 446 MS
QTC FREDERICIA: 409 MS
R AXIS: 2 DEGREES
T AXIS: 50 DEGREES
T OFFSET: 387 MS
VENTRICULAR RATE: 101 BPM

## 2025-09-07 LAB — BACTERIA BLD CULT: NORMAL
